# Patient Record
Sex: FEMALE | Race: WHITE | NOT HISPANIC OR LATINO | Employment: FULL TIME | RURAL
[De-identification: names, ages, dates, MRNs, and addresses within clinical notes are randomized per-mention and may not be internally consistent; named-entity substitution may affect disease eponyms.]

---

## 2017-08-31 ENCOUNTER — HISTORICAL (OUTPATIENT)
Dept: ADMINISTRATIVE | Facility: HOSPITAL | Age: 37
End: 2017-08-31

## 2017-09-06 LAB
LAB AP CLINICAL INFORMATION: NORMAL
LAB AP GENERAL CAT - HISTORICAL: NORMAL
LAB AP INTERPRETATION/RESULT - HISTORICAL: NEGATIVE
LAB AP SPECIMEN ADEQUACY - HISTORICAL: NORMAL
LAB AP SPECIMEN SUBMITTED - HISTORICAL: NORMAL

## 2018-05-23 ENCOUNTER — HISTORICAL (OUTPATIENT)
Dept: ADMINISTRATIVE | Facility: HOSPITAL | Age: 38
End: 2018-05-23

## 2018-05-24 LAB
LAB AP CLINICAL INFORMATION: NORMAL
LAB AP DIAGNOSIS - HISTORICAL: NORMAL
LAB AP GROSS PATHOLOGY - HISTORICAL: NORMAL
LAB AP SPECIMEN SUBMITTED - HISTORICAL: NORMAL

## 2018-07-16 ENCOUNTER — HISTORICAL (OUTPATIENT)
Dept: ADMINISTRATIVE | Facility: HOSPITAL | Age: 38
End: 2018-07-16

## 2020-06-16 ENCOUNTER — HISTORICAL (OUTPATIENT)
Dept: ADMINISTRATIVE | Facility: HOSPITAL | Age: 40
End: 2020-06-16

## 2020-10-20 ENCOUNTER — HISTORICAL (OUTPATIENT)
Dept: ADMINISTRATIVE | Facility: HOSPITAL | Age: 40
End: 2020-10-20

## 2020-10-20 LAB
ALBUMIN SERPL BCP-MCNC: 3.9 G/DL (ref 3.4–5)
ALBUMIN/GLOB SERPL: 1 {RATIO}
ALP SERPL-CCNC: 102 U/L (ref 50–136)
ALT SERPL W P-5'-P-CCNC: 31 U/L (ref 12–78)
AMYLASE SERPL-CCNC: 96 U/L (ref 25–115)
ANION GAP SERPL CALCULATED.3IONS-SCNC: 14 MMOL/L
AST SERPL W P-5'-P-CCNC: 19 U/L (ref 15–37)
BILIRUB SERPL-MCNC: 0.8 MG/DL (ref 0.2–1)
BUN SERPL-MCNC: 18 MG/DL (ref 7–18)
CALCIUM SERPL-MCNC: 10.2 MG/DL (ref 8.5–10.1)
CHLORIDE SERPL-SCNC: 100 MMOL/L (ref 101–111)
CO2 SERPL-SCNC: 26 MMOL/L (ref 21–32)
CREAT SERPL-MCNC: 0.9 MG/DL (ref 0.6–1.3)
ERYTHROCYTE [DISTWIDTH] IN BLOOD BY AUTOMATED COUNT: 13.1 % (ref 11.5–14.5)
GLOBULIN SER-MCNC: 3.7 G/DL
GLUCOSE SERPL-MCNC: 96 MG/DL (ref 74–106)
HCT VFR BLD AUTO: 43.5 % (ref 38–47)
HGB BLD-MCNC: 14.7 G/DL (ref 12–16)
LIPASE SERPL-CCNC: 753 U/L (ref 73–393)
LYMPHOCYTES # BLD AUTO: 1.7 X10E3/UL (ref 0.9–3)
LYMPHOCYTES NFR BLD AUTO: 15.3 % (ref 16.8–42.5)
MCH RBC QN AUTO: 29.5 PG (ref 27–31)
MCHC RBC AUTO-ENTMCNC: 33.8 G/DL (ref 32–36)
MCV RBC AUTO: 87.2 FL (ref 80–96)
MIXED, AUTO ABSOLUTE: 0.6 X10E3/UL (ref 0.3–1.1)
MIXED, AUTO RELATIVE: 6 % (ref 4.7–13.3)
MPC BLD CALC-MCNC: 9.6 FL (ref 9.4–12.4)
NEUTROPHILS # BLD AUTO: 8.5 X10E3/UL (ref 2.4–7.5)
NEUTROPHILS NFR BLD AUTO: 78.7 % (ref 46.4–74.7)
PLATELET # BLD AUTO: 190 X10E3/UL (ref 150–400)
POTASSIUM SERPL-SCNC: 4.2 MMOL/L (ref 3.6–5)
PROT SERPL-MCNC: 7.6 G/DL (ref 6.4–8.2)
RBC # BLD AUTO: 4.99 X10E6/UL (ref 4.2–5.4)
SODIUM SERPL-SCNC: 136 MMOL/L (ref 135–145)
WBC # BLD AUTO: 10.8 X10E3/UL (ref 4.5–11)

## 2021-03-08 ENCOUNTER — HISTORICAL (OUTPATIENT)
Dept: ADMINISTRATIVE | Facility: HOSPITAL | Age: 41
End: 2021-03-08

## 2021-07-28 ENCOUNTER — OFFICE VISIT (OUTPATIENT)
Dept: FAMILY MEDICINE | Facility: CLINIC | Age: 41
End: 2021-07-28
Payer: COMMERCIAL

## 2021-07-28 VITALS — OXYGEN SATURATION: 96 % | HEART RATE: 64 BPM | TEMPERATURE: 98 F | RESPIRATION RATE: 18 BRPM

## 2021-07-28 DIAGNOSIS — J32.0 MAXILLARY SINUSITIS, UNSPECIFIED CHRONICITY: ICD-10-CM

## 2021-07-28 DIAGNOSIS — R05.8 COUGH WITH EXPOSURE TO COVID-19 VIRUS: Primary | ICD-10-CM

## 2021-07-28 DIAGNOSIS — Z20.822 COUGH WITH EXPOSURE TO COVID-19 VIRUS: Primary | ICD-10-CM

## 2021-07-28 LAB
CTP QC/QA: YES
FLUAV AG NPH QL: NEGATIVE
FLUBV AG NPH QL: NEGATIVE
SARS-COV-2 AG RESP QL IA.RAPID: NEGATIVE

## 2021-07-28 PROCEDURE — 87635 SARS-COV-2 COVID-19 AMP PRB: CPT | Mod: ,,, | Performed by: CLINICAL MEDICAL LABORATORY

## 2021-07-28 PROCEDURE — 1159F MED LIST DOCD IN RCRD: CPT | Mod: CPTII,,, | Performed by: NURSE PRACTITIONER

## 2021-07-28 PROCEDURE — 87635 SARS-COV-2 (COVID-19) QUALITATIVE PCR: ICD-10-PCS | Mod: ,,, | Performed by: CLINICAL MEDICAL LABORATORY

## 2021-07-28 PROCEDURE — 87428 SARSCOV & INF VIR A&B AG IA: CPT | Mod: QW,,, | Performed by: NURSE PRACTITIONER

## 2021-07-28 PROCEDURE — 99214 PR OFFICE/OUTPT VISIT, EST, LEVL IV, 30-39 MIN: ICD-10-PCS | Mod: ,,, | Performed by: NURSE PRACTITIONER

## 2021-07-28 PROCEDURE — 1159F PR MEDICATION LIST DOCUMENTED IN MEDICAL RECORD: ICD-10-PCS | Mod: CPTII,,, | Performed by: NURSE PRACTITIONER

## 2021-07-28 PROCEDURE — 99214 OFFICE O/P EST MOD 30 MIN: CPT | Mod: ,,, | Performed by: NURSE PRACTITIONER

## 2021-07-28 PROCEDURE — 87428 POCT SARS-COV2 (COVID) WITH FLU ANTIGEN: ICD-10-PCS | Mod: QW,,, | Performed by: NURSE PRACTITIONER

## 2021-07-28 RX ORDER — PANTOPRAZOLE SODIUM 40 MG/1
1 TABLET, DELAYED RELEASE ORAL DAILY
COMMUNITY
Start: 2021-07-19 | End: 2024-01-05 | Stop reason: SDUPTHER

## 2021-07-28 RX ORDER — ERGOCALCIFEROL 1.25 MG/1
1 CAPSULE ORAL
COMMUNITY
Start: 2021-07-05 | End: 2021-09-29 | Stop reason: SDUPTHER

## 2021-07-28 RX ORDER — NEBIVOLOL HYDROCHLORIDE 10 MG/1
20 TABLET ORAL DAILY
COMMUNITY
Start: 2021-06-14 | End: 2023-01-17

## 2021-07-28 RX ORDER — NEOMYCIN SULFATE, POLYMYXIN B SULFATE, AND DEXAMETHASONE 3.5; 10000; 1 MG/G; [USP'U]/G; MG/G
1 OINTMENT OPHTHALMIC DAILY PRN
COMMUNITY
Start: 2021-04-13 | End: 2024-01-05

## 2021-07-28 RX ORDER — AZITHROMYCIN 250 MG/1
TABLET, FILM COATED ORAL
Qty: 6 TABLET | Refills: 0 | Status: SHIPPED | OUTPATIENT
Start: 2021-07-28 | End: 2021-08-02

## 2021-07-28 RX ORDER — ASPIRIN 81 MG/1
81 TABLET ORAL DAILY
COMMUNITY

## 2021-07-28 RX ORDER — FLUTICASONE PROPIONATE 50 MCG
1 SPRAY, SUSPENSION (ML) NASAL DAILY
Qty: 18 G | Refills: 0 | Status: SHIPPED | OUTPATIENT
Start: 2021-07-28 | End: 2021-09-29

## 2021-07-29 LAB — SARS-COV-2 RNA RESP QL NAA+PROBE: NEGATIVE

## 2021-08-16 ENCOUNTER — OFFICE VISIT (OUTPATIENT)
Dept: FAMILY MEDICINE | Facility: CLINIC | Age: 41
End: 2021-08-16
Payer: COMMERCIAL

## 2021-08-16 VITALS
SYSTOLIC BLOOD PRESSURE: 132 MMHG | RESPIRATION RATE: 20 BRPM | HEART RATE: 96 BPM | TEMPERATURE: 98 F | DIASTOLIC BLOOD PRESSURE: 62 MMHG | OXYGEN SATURATION: 97 %

## 2021-08-16 DIAGNOSIS — R51.9 NONINTRACTABLE HEADACHE, UNSPECIFIED CHRONICITY PATTERN, UNSPECIFIED HEADACHE TYPE: ICD-10-CM

## 2021-08-16 DIAGNOSIS — Z20.822 EXPOSURE TO COVID-19 VIRUS: Primary | ICD-10-CM

## 2021-08-16 LAB
CTP QC/QA: YES
SARS-COV-2 AG RESP QL IA.RAPID: NEGATIVE

## 2021-08-16 PROCEDURE — 3078F PR MOST RECENT DIASTOLIC BLOOD PRESSURE < 80 MM HG: ICD-10-PCS | Mod: CPTII,,, | Performed by: NURSE PRACTITIONER

## 2021-08-16 PROCEDURE — 87426 SARS CORONAVIRUS 2 ANTIGEN POCT: ICD-10-PCS | Mod: QW,,, | Performed by: NURSE PRACTITIONER

## 2021-08-16 PROCEDURE — 87426 SARSCOV CORONAVIRUS AG IA: CPT | Mod: QW,,, | Performed by: NURSE PRACTITIONER

## 2021-08-16 PROCEDURE — 3075F PR MOST RECENT SYSTOLIC BLOOD PRESS GE 130-139MM HG: ICD-10-PCS | Mod: CPTII,,, | Performed by: NURSE PRACTITIONER

## 2021-08-16 PROCEDURE — 87635 SARS-COV-2 (COVID-19) QUALITATIVE PCR: ICD-10-PCS | Mod: ,,, | Performed by: CLINICAL MEDICAL LABORATORY

## 2021-08-16 PROCEDURE — 87635 SARS-COV-2 COVID-19 AMP PRB: CPT | Mod: ,,, | Performed by: CLINICAL MEDICAL LABORATORY

## 2021-08-16 PROCEDURE — 3078F DIAST BP <80 MM HG: CPT | Mod: CPTII,,, | Performed by: NURSE PRACTITIONER

## 2021-08-16 PROCEDURE — 1159F MED LIST DOCD IN RCRD: CPT | Mod: CPTII,,, | Performed by: NURSE PRACTITIONER

## 2021-08-16 PROCEDURE — 99214 PR OFFICE/OUTPT VISIT, EST, LEVL IV, 30-39 MIN: ICD-10-PCS | Mod: ,,, | Performed by: NURSE PRACTITIONER

## 2021-08-16 PROCEDURE — 3075F SYST BP GE 130 - 139MM HG: CPT | Mod: CPTII,,, | Performed by: NURSE PRACTITIONER

## 2021-08-16 PROCEDURE — 1159F PR MEDICATION LIST DOCUMENTED IN MEDICAL RECORD: ICD-10-PCS | Mod: CPTII,,, | Performed by: NURSE PRACTITIONER

## 2021-08-16 PROCEDURE — 1126F PR PAIN SEVERITY QUANTIFIED, NO PAIN PRESENT: ICD-10-PCS | Mod: CPTII,,, | Performed by: NURSE PRACTITIONER

## 2021-08-16 PROCEDURE — 1126F AMNT PAIN NOTED NONE PRSNT: CPT | Mod: CPTII,,, | Performed by: NURSE PRACTITIONER

## 2021-08-16 PROCEDURE — 99214 OFFICE O/P EST MOD 30 MIN: CPT | Mod: ,,, | Performed by: NURSE PRACTITIONER

## 2021-08-16 RX ORDER — PREDNISONE 10 MG/1
10 TABLET ORAL DAILY
Qty: 10 TABLET | Refills: 0 | Status: SHIPPED | OUTPATIENT
Start: 2021-08-16 | End: 2021-09-29

## 2021-08-16 RX ORDER — LOPERAMIDE HYDROCHLORIDE 2 MG/1
2 CAPSULE ORAL 4 TIMES DAILY PRN
Qty: 30 CAPSULE | Refills: 0 | Status: SHIPPED | OUTPATIENT
Start: 2021-08-16 | End: 2021-08-26

## 2021-08-16 RX ORDER — FAMOTIDINE 20 MG/1
20 TABLET, FILM COATED ORAL 2 TIMES DAILY
Qty: 56 TABLET | Refills: 0 | Status: SHIPPED | OUTPATIENT
Start: 2021-08-16 | End: 2022-08-08

## 2021-08-16 RX ORDER — ONDANSETRON 4 MG/1
4 TABLET, FILM COATED ORAL 2 TIMES DAILY
Qty: 21 TABLET | Refills: 0 | Status: SHIPPED | OUTPATIENT
Start: 2021-08-16 | End: 2021-09-29

## 2021-08-16 RX ORDER — AZITHROMYCIN 250 MG/1
TABLET, FILM COATED ORAL
Qty: 6 TABLET | Refills: 0 | Status: SHIPPED | OUTPATIENT
Start: 2021-08-16 | End: 2021-08-21

## 2021-08-17 LAB — SARS-COV-2 RNA RESP QL NAA+PROBE: NEGATIVE

## 2021-09-13 ENCOUNTER — HOSPITAL ENCOUNTER (OUTPATIENT)
Dept: RADIOLOGY | Facility: HOSPITAL | Age: 41
Discharge: HOME | End: 2021-09-13
Payer: COMMERCIAL

## 2021-09-13 VITALS — HEIGHT: 67 IN | BODY MASS INDEX: 45.99 KG/M2 | WEIGHT: 293 LBS

## 2021-09-13 DIAGNOSIS — Z12.39 SCREENING FOR BREAST CANCER: ICD-10-CM

## 2021-09-13 PROCEDURE — 77067 SCR MAMMO BI INCL CAD: CPT | Mod: TC

## 2021-09-29 ENCOUNTER — OFFICE VISIT (OUTPATIENT)
Dept: FAMILY MEDICINE | Facility: CLINIC | Age: 41
End: 2021-09-29
Payer: COMMERCIAL

## 2021-09-29 VITALS
TEMPERATURE: 98 F | HEART RATE: 72 BPM | BODY MASS INDEX: 45.99 KG/M2 | OXYGEN SATURATION: 98 % | RESPIRATION RATE: 18 BRPM | WEIGHT: 293 LBS | DIASTOLIC BLOOD PRESSURE: 82 MMHG | SYSTOLIC BLOOD PRESSURE: 132 MMHG | HEIGHT: 67 IN

## 2021-09-29 DIAGNOSIS — I10 HYPERTENSION, UNSPECIFIED TYPE: Primary | ICD-10-CM

## 2021-09-29 DIAGNOSIS — Z11.52 ENCOUNTER FOR SCREENING FOR COVID-19: ICD-10-CM

## 2021-09-29 DIAGNOSIS — E55.9 VITAMIN D DEFICIENCY: ICD-10-CM

## 2021-09-29 LAB
ALBUMIN SERPL BCP-MCNC: 3.8 G/DL (ref 3.5–5)
ALBUMIN/GLOB SERPL: 1 {RATIO}
ALP SERPL-CCNC: 93 U/L (ref 37–98)
ALT SERPL W P-5'-P-CCNC: 33 U/L (ref 13–56)
ANION GAP SERPL CALCULATED.3IONS-SCNC: 10 MMOL/L (ref 7–16)
AST SERPL W P-5'-P-CCNC: 23 U/L (ref 15–37)
BASOPHILS # BLD AUTO: 0.05 K/UL (ref 0–0.2)
BASOPHILS NFR BLD AUTO: 0.6 % (ref 0–1)
BILIRUB SERPL-MCNC: 0.8 MG/DL (ref 0–1.2)
BUN SERPL-MCNC: 15 MG/DL (ref 7–18)
BUN/CREAT SERPL: 18 (ref 6–20)
CALCIUM SERPL-MCNC: 9.6 MG/DL (ref 8.5–10.1)
CHLORIDE SERPL-SCNC: 108 MMOL/L (ref 98–107)
CHOLEST SERPL-MCNC: 172 MG/DL (ref 0–200)
CHOLEST/HDLC SERPL: 4 {RATIO}
CO2 SERPL-SCNC: 26 MMOL/L (ref 21–32)
CREAT SERPL-MCNC: 0.84 MG/DL (ref 0.55–1.02)
DIFFERENTIAL METHOD BLD: ABNORMAL
EOSINOPHIL # BLD AUTO: 0.28 K/UL (ref 0–0.5)
EOSINOPHIL NFR BLD AUTO: 3.4 % (ref 1–4)
ERYTHROCYTE [DISTWIDTH] IN BLOOD BY AUTOMATED COUNT: 13.4 % (ref 11.5–14.5)
GLOBULIN SER-MCNC: 3.8 G/DL (ref 2–4)
GLUCOSE SERPL-MCNC: 98 MG/DL (ref 74–106)
HCT VFR BLD AUTO: 42.9 % (ref 38–47)
HDLC SERPL-MCNC: 43 MG/DL (ref 40–60)
HGB BLD-MCNC: 14.3 G/DL (ref 12–16)
IMM GRANULOCYTES # BLD AUTO: 0.06 K/UL (ref 0–0.04)
IMM GRANULOCYTES NFR BLD: 0.7 % (ref 0–0.4)
LDLC SERPL CALC-MCNC: 91 MG/DL
LDLC/HDLC SERPL: 2.1 {RATIO}
LYMPHOCYTES # BLD AUTO: 1.86 K/UL (ref 1–4.8)
LYMPHOCYTES NFR BLD AUTO: 22.6 % (ref 27–41)
MCH RBC QN AUTO: 28.9 PG (ref 27–31)
MCHC RBC AUTO-ENTMCNC: 33.3 G/DL (ref 32–36)
MCV RBC AUTO: 86.7 FL (ref 80–96)
MONOCYTES # BLD AUTO: 0.64 K/UL (ref 0–0.8)
MONOCYTES NFR BLD AUTO: 7.8 % (ref 2–6)
MPC BLD CALC-MCNC: 11.5 FL (ref 9.4–12.4)
NEUTROPHILS # BLD AUTO: 5.34 K/UL (ref 1.8–7.7)
NEUTROPHILS NFR BLD AUTO: 64.9 % (ref 53–65)
NONHDLC SERPL-MCNC: 129 MG/DL
NRBC # BLD AUTO: 0 X10E3/UL
NRBC, AUTO (.00): 0 %
PLATELET # BLD AUTO: 221 K/UL (ref 150–400)
POTASSIUM SERPL-SCNC: 4.5 MMOL/L (ref 3.5–5.1)
PROT SERPL-MCNC: 7.6 G/DL (ref 6.4–8.2)
RBC # BLD AUTO: 4.95 M/UL (ref 4.2–5.4)
SODIUM SERPL-SCNC: 139 MMOL/L (ref 136–145)
TRIGL SERPL-MCNC: 190 MG/DL (ref 35–150)
VLDLC SERPL-MCNC: 38 MG/DL
WBC # BLD AUTO: 8.23 K/UL (ref 4.5–11)

## 2021-09-29 PROCEDURE — 99214 OFFICE O/P EST MOD 30 MIN: CPT | Mod: ,,, | Performed by: NURSE PRACTITIONER

## 2021-09-29 PROCEDURE — 1159F PR MEDICATION LIST DOCUMENTED IN MEDICAL RECORD: ICD-10-PCS | Mod: CPTII,,, | Performed by: NURSE PRACTITIONER

## 2021-09-29 PROCEDURE — 1159F MED LIST DOCD IN RCRD: CPT | Mod: CPTII,,, | Performed by: NURSE PRACTITIONER

## 2021-09-29 PROCEDURE — 3075F SYST BP GE 130 - 139MM HG: CPT | Mod: CPTII,,, | Performed by: NURSE PRACTITIONER

## 2021-09-29 PROCEDURE — 3008F BODY MASS INDEX DOCD: CPT | Mod: CPTII,,, | Performed by: NURSE PRACTITIONER

## 2021-09-29 PROCEDURE — 80053 COMPREHEN METABOLIC PANEL: CPT | Mod: ,,, | Performed by: CLINICAL MEDICAL LABORATORY

## 2021-09-29 PROCEDURE — 3075F PR MOST RECENT SYSTOLIC BLOOD PRESS GE 130-139MM HG: ICD-10-PCS | Mod: CPTII,,, | Performed by: NURSE PRACTITIONER

## 2021-09-29 PROCEDURE — 80061 LIPID PANEL: CPT | Mod: ,,, | Performed by: CLINICAL MEDICAL LABORATORY

## 2021-09-29 PROCEDURE — 3008F PR BODY MASS INDEX (BMI) DOCUMENTED: ICD-10-PCS | Mod: CPTII,,, | Performed by: NURSE PRACTITIONER

## 2021-09-29 PROCEDURE — 85025 CBC WITH DIFFERENTIAL: ICD-10-PCS | Mod: ,,, | Performed by: CLINICAL MEDICAL LABORATORY

## 2021-09-29 PROCEDURE — 80053 COMPREHENSIVE METABOLIC PANEL: ICD-10-PCS | Mod: ,,, | Performed by: CLINICAL MEDICAL LABORATORY

## 2021-09-29 PROCEDURE — 86769 SARS-COV-2 COVID-19 ANTIBODY: CPT | Mod: 90,,, | Performed by: CLINICAL MEDICAL LABORATORY

## 2021-09-29 PROCEDURE — 80061 LIPID PANEL: ICD-10-PCS | Mod: ,,, | Performed by: CLINICAL MEDICAL LABORATORY

## 2021-09-29 PROCEDURE — 3079F PR MOST RECENT DIASTOLIC BLOOD PRESSURE 80-89 MM HG: ICD-10-PCS | Mod: CPTII,,, | Performed by: NURSE PRACTITIONER

## 2021-09-29 PROCEDURE — 3079F DIAST BP 80-89 MM HG: CPT | Mod: CPTII,,, | Performed by: NURSE PRACTITIONER

## 2021-09-29 PROCEDURE — 86769 SARS-COV-2 SPIKE AB, SEMI-QUANT, S: ICD-10-PCS | Mod: 90,,, | Performed by: CLINICAL MEDICAL LABORATORY

## 2021-09-29 PROCEDURE — 99214 PR OFFICE/OUTPT VISIT, EST, LEVL IV, 30-39 MIN: ICD-10-PCS | Mod: ,,, | Performed by: NURSE PRACTITIONER

## 2021-09-29 PROCEDURE — 85025 COMPLETE CBC W/AUTO DIFF WBC: CPT | Mod: ,,, | Performed by: CLINICAL MEDICAL LABORATORY

## 2021-09-29 RX ORDER — ERGOCALCIFEROL 1.25 MG/1
50000 CAPSULE ORAL
Qty: 3 CAPSULE | Refills: 1 | Status: SHIPPED | OUTPATIENT
Start: 2021-09-29 | End: 2022-03-29 | Stop reason: SDUPTHER

## 2021-10-01 LAB
M SARS-COV-2 SPIKE AB, INTERP, S: POSITIVE
M SARS-COV-2 SPIKE AB, QUANT, S: >250 U/ML

## 2021-10-29 ENCOUNTER — OFFICE VISIT (OUTPATIENT)
Dept: FAMILY MEDICINE | Facility: CLINIC | Age: 41
End: 2021-10-29
Payer: COMMERCIAL

## 2021-10-29 VITALS
SYSTOLIC BLOOD PRESSURE: 132 MMHG | RESPIRATION RATE: 20 BRPM | TEMPERATURE: 98 F | OXYGEN SATURATION: 99 % | HEART RATE: 74 BPM | DIASTOLIC BLOOD PRESSURE: 72 MMHG

## 2021-10-29 DIAGNOSIS — R09.1 PLEURISY: Primary | ICD-10-CM

## 2021-10-29 DIAGNOSIS — R05.9 COUGH: ICD-10-CM

## 2021-10-29 DIAGNOSIS — R19.7 DIARRHEA, UNSPECIFIED TYPE: ICD-10-CM

## 2021-10-29 PROCEDURE — 3075F PR MOST RECENT SYSTOLIC BLOOD PRESS GE 130-139MM HG: ICD-10-PCS | Mod: CPTII,,, | Performed by: NURSE PRACTITIONER

## 2021-10-29 PROCEDURE — 3075F SYST BP GE 130 - 139MM HG: CPT | Mod: CPTII,,, | Performed by: NURSE PRACTITIONER

## 2021-10-29 PROCEDURE — 99214 OFFICE O/P EST MOD 30 MIN: CPT | Mod: 25,,, | Performed by: NURSE PRACTITIONER

## 2021-10-29 PROCEDURE — 87428 POCT SARS-COV2 (COVID) WITH FLU ANTIGEN: ICD-10-PCS | Mod: QW,,, | Performed by: NURSE PRACTITIONER

## 2021-10-29 PROCEDURE — 96372 THER/PROPH/DIAG INJ SC/IM: CPT | Mod: ,,, | Performed by: NURSE PRACTITIONER

## 2021-10-29 PROCEDURE — 1159F MED LIST DOCD IN RCRD: CPT | Mod: CPTII,,, | Performed by: NURSE PRACTITIONER

## 2021-10-29 PROCEDURE — 1159F PR MEDICATION LIST DOCUMENTED IN MEDICAL RECORD: ICD-10-PCS | Mod: CPTII,,, | Performed by: NURSE PRACTITIONER

## 2021-10-29 PROCEDURE — 99214 PR OFFICE/OUTPT VISIT, EST, LEVL IV, 30-39 MIN: ICD-10-PCS | Mod: 25,,, | Performed by: NURSE PRACTITIONER

## 2021-10-29 PROCEDURE — 87428 SARSCOV & INF VIR A&B AG IA: CPT | Mod: QW,,, | Performed by: NURSE PRACTITIONER

## 2021-10-29 PROCEDURE — 3078F DIAST BP <80 MM HG: CPT | Mod: CPTII,,, | Performed by: NURSE PRACTITIONER

## 2021-10-29 PROCEDURE — 3078F PR MOST RECENT DIASTOLIC BLOOD PRESSURE < 80 MM HG: ICD-10-PCS | Mod: CPTII,,, | Performed by: NURSE PRACTITIONER

## 2021-10-29 PROCEDURE — 96372 PR INJECTION,THERAP/PROPH/DIAG2ST, IM OR SUBCUT: ICD-10-PCS | Mod: ,,, | Performed by: NURSE PRACTITIONER

## 2021-10-29 RX ORDER — DEXAMETHASONE SODIUM PHOSPHATE 4 MG/ML
4 INJECTION, SOLUTION INTRA-ARTICULAR; INTRALESIONAL; INTRAMUSCULAR; INTRAVENOUS; SOFT TISSUE
Status: COMPLETED | OUTPATIENT
Start: 2021-10-29 | End: 2021-10-29

## 2021-10-29 RX ORDER — LINCOMYCIN HYDROCHLORIDE 300 MG/ML
600 INJECTION, SOLUTION INTRAMUSCULAR; INTRAVENOUS; SUBCONJUNCTIVAL
Status: COMPLETED | OUTPATIENT
Start: 2021-10-29 | End: 2021-10-29

## 2021-10-29 RX ORDER — DIPHENOXYLATE HYDROCHLORIDE AND ATROPINE SULFATE 2.5; .025 MG/1; MG/1
1 TABLET ORAL 4 TIMES DAILY PRN
Qty: 28 TABLET | Refills: 1 | Status: SHIPPED | OUTPATIENT
Start: 2021-10-29 | End: 2021-11-08

## 2021-10-29 RX ORDER — PREDNISONE 10 MG/1
10 TABLET ORAL DAILY
Qty: 10 TABLET | Refills: 0 | Status: SHIPPED | OUTPATIENT
Start: 2021-10-29 | End: 2022-08-08

## 2021-10-29 RX ORDER — AZITHROMYCIN 250 MG/1
TABLET, FILM COATED ORAL
Qty: 6 TABLET | Refills: 0 | Status: SHIPPED | OUTPATIENT
Start: 2021-10-29 | End: 2021-11-03

## 2021-10-29 RX ORDER — NAPROXEN SODIUM 550 MG/1
550 TABLET ORAL 2 TIMES DAILY WITH MEALS
Qty: 20 TABLET | Refills: 0 | Status: SHIPPED | OUTPATIENT
Start: 2021-10-29 | End: 2022-08-08

## 2021-10-29 RX ADMIN — DEXAMETHASONE SODIUM PHOSPHATE 4 MG: 4 INJECTION, SOLUTION INTRA-ARTICULAR; INTRALESIONAL; INTRAMUSCULAR; INTRAVENOUS; SOFT TISSUE at 09:10

## 2021-10-29 RX ADMIN — LINCOMYCIN HYDROCHLORIDE 600 MG: 300 INJECTION, SOLUTION INTRAMUSCULAR; INTRAVENOUS; SUBCONJUNCTIVAL at 09:10

## 2022-03-07 ENCOUNTER — OFFICE VISIT (OUTPATIENT)
Dept: FAMILY MEDICINE | Facility: CLINIC | Age: 42
End: 2022-03-07
Payer: COMMERCIAL

## 2022-03-07 VITALS
RESPIRATION RATE: 18 BRPM | HEART RATE: 93 BPM | BODY MASS INDEX: 45.99 KG/M2 | OXYGEN SATURATION: 98 % | DIASTOLIC BLOOD PRESSURE: 76 MMHG | TEMPERATURE: 98 F | WEIGHT: 293 LBS | HEIGHT: 67 IN | SYSTOLIC BLOOD PRESSURE: 134 MMHG

## 2022-03-07 DIAGNOSIS — R09.81 NASAL CONGESTION: Primary | ICD-10-CM

## 2022-03-07 DIAGNOSIS — J01.01 ACUTE RECURRENT MAXILLARY SINUSITIS: ICD-10-CM

## 2022-03-07 PROBLEM — R09.1 PLEURISY: Status: RESOLVED | Noted: 2021-10-29 | Resolved: 2022-03-07

## 2022-03-07 PROBLEM — Z11.52 ENCOUNTER FOR SCREENING FOR COVID-19: Status: RESOLVED | Noted: 2021-09-29 | Resolved: 2022-03-07

## 2022-03-07 PROBLEM — R05.9 COUGH: Status: RESOLVED | Noted: 2021-10-29 | Resolved: 2022-03-07

## 2022-03-07 PROBLEM — Z20.822 EXPOSURE TO COVID-19 VIRUS: Status: RESOLVED | Noted: 2021-07-28 | Resolved: 2022-03-07

## 2022-03-07 PROBLEM — R51.9 NONINTRACTABLE HEADACHE: Status: RESOLVED | Noted: 2021-08-16 | Resolved: 2022-03-07

## 2022-03-07 LAB
CTP QC/QA: YES
CTP QC/QA: YES
FLUAV AG NPH QL: NEGATIVE
FLUBV AG NPH QL: NEGATIVE
S PYO RRNA THROAT QL PROBE: NEGATIVE
SARS-COV-2 AG RESP QL IA.RAPID: NEGATIVE

## 2022-03-07 PROCEDURE — 3078F PR MOST RECENT DIASTOLIC BLOOD PRESSURE < 80 MM HG: ICD-10-PCS | Mod: ,,, | Performed by: NURSE PRACTITIONER

## 2022-03-07 PROCEDURE — 87428 SARSCOV & INF VIR A&B AG IA: CPT | Mod: QW,,, | Performed by: NURSE PRACTITIONER

## 2022-03-07 PROCEDURE — 87428 POCT SARS-COV2 (COVID) WITH FLU ANTIGEN: ICD-10-PCS | Mod: QW,,, | Performed by: NURSE PRACTITIONER

## 2022-03-07 PROCEDURE — 99214 OFFICE O/P EST MOD 30 MIN: CPT | Mod: 25,,, | Performed by: NURSE PRACTITIONER

## 2022-03-07 PROCEDURE — 3075F SYST BP GE 130 - 139MM HG: CPT | Mod: ,,, | Performed by: NURSE PRACTITIONER

## 2022-03-07 PROCEDURE — 3075F PR MOST RECENT SYSTOLIC BLOOD PRESS GE 130-139MM HG: ICD-10-PCS | Mod: ,,, | Performed by: NURSE PRACTITIONER

## 2022-03-07 PROCEDURE — 87880 STREP A ASSAY W/OPTIC: CPT | Mod: QW,,, | Performed by: NURSE PRACTITIONER

## 2022-03-07 PROCEDURE — 96372 THER/PROPH/DIAG INJ SC/IM: CPT | Mod: ,,, | Performed by: NURSE PRACTITIONER

## 2022-03-07 PROCEDURE — 3078F DIAST BP <80 MM HG: CPT | Mod: ,,, | Performed by: NURSE PRACTITIONER

## 2022-03-07 PROCEDURE — 3008F PR BODY MASS INDEX (BMI) DOCUMENTED: ICD-10-PCS | Mod: ,,, | Performed by: NURSE PRACTITIONER

## 2022-03-07 PROCEDURE — 96372 PR INJECTION,THERAP/PROPH/DIAG2ST, IM OR SUBCUT: ICD-10-PCS | Mod: ,,, | Performed by: NURSE PRACTITIONER

## 2022-03-07 PROCEDURE — 3008F BODY MASS INDEX DOCD: CPT | Mod: ,,, | Performed by: NURSE PRACTITIONER

## 2022-03-07 PROCEDURE — 1159F PR MEDICATION LIST DOCUMENTED IN MEDICAL RECORD: ICD-10-PCS | Mod: ,,, | Performed by: NURSE PRACTITIONER

## 2022-03-07 PROCEDURE — 87880 POCT RAPID STREP A: ICD-10-PCS | Mod: QW,,, | Performed by: NURSE PRACTITIONER

## 2022-03-07 PROCEDURE — 1159F MED LIST DOCD IN RCRD: CPT | Mod: ,,, | Performed by: NURSE PRACTITIONER

## 2022-03-07 PROCEDURE — 99214 PR OFFICE/OUTPT VISIT, EST, LEVL IV, 30-39 MIN: ICD-10-PCS | Mod: 25,,, | Performed by: NURSE PRACTITIONER

## 2022-03-07 RX ORDER — DOXYCYCLINE 100 MG/1
100 CAPSULE ORAL 2 TIMES DAILY
Qty: 20 CAPSULE | Refills: 0 | Status: SHIPPED | OUTPATIENT
Start: 2022-03-07 | End: 2022-08-08 | Stop reason: ALTCHOICE

## 2022-03-07 RX ORDER — DEXAMETHASONE SODIUM PHOSPHATE 4 MG/ML
4 INJECTION, SOLUTION INTRA-ARTICULAR; INTRALESIONAL; INTRAMUSCULAR; INTRAVENOUS; SOFT TISSUE ONCE
Status: COMPLETED | OUTPATIENT
Start: 2022-03-07 | End: 2022-03-07

## 2022-03-07 RX ORDER — CHLORPHENIRAMINE MALEATE AND PHENYLEPHRINE HYDROCHLORIDE 4; 10 MG/1; MG/1
1 TABLET, COATED ORAL EVERY 6 HOURS PRN
Qty: 30 TABLET | Refills: 0 | Status: SHIPPED | OUTPATIENT
Start: 2022-03-07 | End: 2022-03-17

## 2022-03-07 RX ORDER — FLUTICASONE PROPIONATE 50 MCG
1 SPRAY, SUSPENSION (ML) NASAL DAILY
Qty: 15.8 ML | Refills: 5 | Status: SHIPPED | OUTPATIENT
Start: 2022-03-07 | End: 2022-08-08

## 2022-03-07 RX ORDER — LINCOMYCIN HYDROCHLORIDE 300 MG/ML
600 INJECTION, SOLUTION INTRAMUSCULAR; INTRAVENOUS; SUBCONJUNCTIVAL
Status: COMPLETED | OUTPATIENT
Start: 2022-03-07 | End: 2022-03-07

## 2022-03-07 RX ADMIN — DEXAMETHASONE SODIUM PHOSPHATE 4 MG: 4 INJECTION, SOLUTION INTRA-ARTICULAR; INTRALESIONAL; INTRAMUSCULAR; INTRAVENOUS; SOFT TISSUE at 09:03

## 2022-03-07 RX ADMIN — LINCOMYCIN HYDROCHLORIDE 600 MG: 300 INJECTION, SOLUTION INTRAMUSCULAR; INTRAVENOUS; SUBCONJUNCTIVAL at 09:03

## 2022-03-07 NOTE — PROGRESS NOTES
Sarah Dacosta NP   [facility]  [address]  [place]     PATIENT NAME: Komal Busch  : 1980  DATE: 3/7/22  MRN: [id number]      Billing Provider: Sarah Dacosta NP  Level of Service:   Patient PCP Information     Provider PCP Type    Sarah Dacosta NP General          Reason for Visit / Chief Complaint: Nasal Congestion, Cough, Sore Throat, and Diarrhea (Symptoms began saturday)       Update PCP  Update Chief Complaint         History of Present Illness / Problem Focused Workflow     Komal Busch presents to the clinic here for eval of nasal congestion, cough, sore throat and diarrhea that started 3 days ago.       Review of Systems     Review of Systems   Constitutional: Negative for chills, fatigue and fever.   HENT: Positive for nasal congestion, sinus pressure/congestion and sore throat. Negative for ear pain, facial swelling, hearing loss, mouth dryness, mouth sores, postnasal drip, rhinorrhea and goiter.    Eyes: Negative for discharge and itching.   Respiratory: Negative for cough, shortness of breath and wheezing.    Cardiovascular: Negative for chest pain and leg swelling.   Gastrointestinal: Positive for diarrhea. Negative for abdominal pain, change in bowel habit and change in bowel habit.   Genitourinary: Negative for difficulty urinating, dysuria, enuresis, frequency, hematuria and urgency.   Neurological: Negative for dizziness, vertigo, syncope, weakness and headaches.   Psychiatric/Behavioral: Negative for decreased concentration.        Medical / Social / Family History     Past Medical History:   Diagnosis Date    GERD (gastroesophageal reflux disease)     Hypertension        Past Surgical History:   Procedure Laterality Date    CHOLECYSTECTOMY      HYSTERECTOMY      KNEE SURGERY Left        Social History  Ms.  reports that she has never smoked. She has never used smokeless tobacco. She reports previous alcohol use. She reports that she does not use  "drugs.    Family History  Ms.'s family history is not on file.    Medications and Allergies     Medications  Outpatient Medications Marked as Taking for the 3/7/22 encounter (Office Visit) with Sarah Dacosta NP   Medication Sig Dispense Refill    BYSTOLIC 10 mg Tab Take 1 tablet by mouth once daily.      ergocalciferol (ERGOCALCIFEROL) 50,000 unit Cap Take 1 capsule (50,000 Units total) by mouth every 30 days. 3 capsule 1    pantoprazole (PROTONIX) 40 MG tablet Take 1 tablet by mouth once daily.         Allergies  Review of patient's allergies indicates:   Allergen Reactions    Keflex [cephalexin]     Neosporin [neomycin-bacitracnzn-polymyxnb]     Pcn [penicillins]     Reglan [metoclopramide]        Physical Examination     Vitals:    03/07/22 0845   BP: 134/76   BP Location: Left arm   Patient Position: Sitting   BP Method: Medium (Manual)   Pulse: 93   Resp: 18   Temp: 98.2 °F (36.8 °C)   TempSrc: Oral   SpO2: 98%   Weight: (!) 143.3 kg (316 lb)   Height: 5' 7" (1.702 m)      Physical Exam  Vitals and nursing note reviewed.   Constitutional:       Appearance: Normal appearance.   HENT:      Head: Normocephalic.      Right Ear: Tympanic membrane, ear canal and external ear normal.      Left Ear: Tympanic membrane, ear canal and external ear normal.      Nose: Congestion present.      Comments: Mod amt thick yellow nasal secretion, pressure over max region     Mouth/Throat:      Mouth: Mucous membranes are moist.   Eyes:      Extraocular Movements: Extraocular movements intact.      Conjunctiva/sclera: Conjunctivae normal.      Pupils: Pupils are equal, round, and reactive to light.   Neck:      Comments: Jarett ant cervical nodes  Cardiovascular:      Rate and Rhythm: Normal rate and regular rhythm.      Pulses: Normal pulses.      Heart sounds: Normal heart sounds.   Pulmonary:      Effort: Pulmonary effort is normal.      Breath sounds: Normal breath sounds.   Musculoskeletal:         General: Normal range " of motion.      Cervical back: Normal range of motion.   Lymphadenopathy:      Cervical: Cervical adenopathy present.   Skin:     General: Skin is warm and dry.   Neurological:      General: No focal deficit present.      Mental Status: She is alert and oriented to person, place, and time.   Psychiatric:         Behavior: Behavior normal.          Assessment and Plan (including Health Maintenance)      Problem List  Smart [employer]  Document Outside HM   :    Plan:     Nasal congestion  -     POCT SARS-COV2 (COVID) with Flu Antigen  -     POCT rapid strep A            Health Maintenance Due   Topic Date Due    Hepatitis C Screening  Never done    COVID-19 Vaccine (1) Never done    HIV Screening  Never done    TETANUS VACCINE  Never done    Influenza Vaccine (1) Never done       Problem List Items Addressed This Visit    None     Visit Diagnoses     Nasal congestion    -  Primary    Relevant Orders    POCT SARS-COV2 (COVID) with Flu Antigen (Completed)    POCT rapid strep A (Completed)            Health Maintenance Topics with due status: Not Due       Topic Last Completion Date    Mammogram 09/13/2021       Procedures     Future Appointments   Date Time Provider Department Center   3/29/2022  9:00 AM Sarah Dacosta NP [facility]   9/19/2022  9:30 AM [facility]        No follow-ups on file.       Signature:  Sarah Dacosta NP    Date of encounter: 3/7/22

## 2022-03-29 ENCOUNTER — OFFICE VISIT (OUTPATIENT)
Dept: FAMILY MEDICINE | Facility: CLINIC | Age: 42
End: 2022-03-29
Payer: COMMERCIAL

## 2022-03-29 VITALS
HEART RATE: 68 BPM | WEIGHT: 293 LBS | TEMPERATURE: 98 F | SYSTOLIC BLOOD PRESSURE: 120 MMHG | DIASTOLIC BLOOD PRESSURE: 74 MMHG | HEIGHT: 67 IN | RESPIRATION RATE: 18 BRPM | OXYGEN SATURATION: 97 % | BODY MASS INDEX: 45.99 KG/M2

## 2022-03-29 DIAGNOSIS — Z13.1 SCREENING FOR DIABETES MELLITUS: ICD-10-CM

## 2022-03-29 DIAGNOSIS — Z00.00 VISIT FOR PREVENTIVE HEALTH EXAMINATION: Primary | ICD-10-CM

## 2022-03-29 DIAGNOSIS — Z13.220 SCREENING FOR LIPOID DISORDERS: ICD-10-CM

## 2022-03-29 DIAGNOSIS — E55.9 VITAMIN D DEFICIENCY: ICD-10-CM

## 2022-03-29 DIAGNOSIS — Z12.4 ENCOUNTER FOR SCREENING FOR CERVICAL CANCER: ICD-10-CM

## 2022-03-29 DIAGNOSIS — Z12.72 SCREENING FOR VAGINAL CANCER: ICD-10-CM

## 2022-03-29 LAB
25(OH)D3 SERPL-MCNC: 29.2 NG/ML
CHOLEST SERPL-MCNC: 180 MG/DL (ref 0–200)
CHOLEST/HDLC SERPL: 3.5 {RATIO}
GLUCOSE SERPL-MCNC: 97 MG/DL (ref 74–106)
HDLC SERPL-MCNC: 52 MG/DL (ref 40–60)
LDLC SERPL CALC-MCNC: 92 MG/DL
LDLC/HDLC SERPL: 1.8 {RATIO}
NONHDLC SERPL-MCNC: 128 MG/DL
TRIGL SERPL-MCNC: 181 MG/DL (ref 35–150)
VLDLC SERPL-MCNC: 36 MG/DL

## 2022-03-29 PROCEDURE — 3074F PR MOST RECENT SYSTOLIC BLOOD PRESSURE < 130 MM HG: ICD-10-PCS | Mod: ,,, | Performed by: NURSE PRACTITIONER

## 2022-03-29 PROCEDURE — 82306 VITAMIN D 25 HYDROXY: CPT | Mod: ,,, | Performed by: CLINICAL MEDICAL LABORATORY

## 2022-03-29 PROCEDURE — 3078F PR MOST RECENT DIASTOLIC BLOOD PRESSURE < 80 MM HG: ICD-10-PCS | Mod: ,,, | Performed by: NURSE PRACTITIONER

## 2022-03-29 PROCEDURE — 99396 PREV VISIT EST AGE 40-64: CPT | Mod: ,,, | Performed by: NURSE PRACTITIONER

## 2022-03-29 PROCEDURE — 1159F MED LIST DOCD IN RCRD: CPT | Mod: ,,, | Performed by: NURSE PRACTITIONER

## 2022-03-29 PROCEDURE — 82947 GLUCOSE, FASTING: ICD-10-PCS | Mod: ,,, | Performed by: CLINICAL MEDICAL LABORATORY

## 2022-03-29 PROCEDURE — 80061 LIPID PANEL: ICD-10-PCS | Mod: ,,, | Performed by: CLINICAL MEDICAL LABORATORY

## 2022-03-29 PROCEDURE — 3074F SYST BP LT 130 MM HG: CPT | Mod: ,,, | Performed by: NURSE PRACTITIONER

## 2022-03-29 PROCEDURE — 82947 ASSAY GLUCOSE BLOOD QUANT: CPT | Mod: ,,, | Performed by: CLINICAL MEDICAL LABORATORY

## 2022-03-29 PROCEDURE — 82306 VITAMIN D: ICD-10-PCS | Mod: ,,, | Performed by: CLINICAL MEDICAL LABORATORY

## 2022-03-29 PROCEDURE — 1159F PR MEDICATION LIST DOCUMENTED IN MEDICAL RECORD: ICD-10-PCS | Mod: ,,, | Performed by: NURSE PRACTITIONER

## 2022-03-29 PROCEDURE — 80061 LIPID PANEL: CPT | Mod: ,,, | Performed by: CLINICAL MEDICAL LABORATORY

## 2022-03-29 PROCEDURE — 3078F DIAST BP <80 MM HG: CPT | Mod: ,,, | Performed by: NURSE PRACTITIONER

## 2022-03-29 PROCEDURE — 3008F BODY MASS INDEX DOCD: CPT | Mod: ,,, | Performed by: NURSE PRACTITIONER

## 2022-03-29 PROCEDURE — 99396 PR PREVENTIVE VISIT,EST,40-64: ICD-10-PCS | Mod: ,,, | Performed by: NURSE PRACTITIONER

## 2022-03-29 PROCEDURE — 3008F PR BODY MASS INDEX (BMI) DOCUMENTED: ICD-10-PCS | Mod: ,,, | Performed by: NURSE PRACTITIONER

## 2022-03-29 PROCEDURE — 88142 CYTOPATH C/V THIN LAYER: CPT | Mod: GCY | Performed by: NURSE PRACTITIONER

## 2022-03-29 RX ORDER — ERGOCALCIFEROL 1.25 MG/1
50000 CAPSULE ORAL
Qty: 3 CAPSULE | Refills: 0 | Status: SHIPPED | OUTPATIENT
Start: 2022-03-29 | End: 2022-08-08 | Stop reason: SDUPTHER

## 2022-03-29 NOTE — PROGRESS NOTES
Sarah Dacosta NP   [facility]  [address]  [place]     PATIENT NAME: Komal Busch  : 1980  DATE: 3/29/22  MRN: [id number]      Billing Provider: Sarah Dacosta NP  Level of Service:   Patient PCP Information     Provider PCP Type    Sarah Dacosta NP General          Reason for Visit / Chief Complaint: Annual Exam (Healthy You)       Update PCP  Update Chief Complaint         History of Present Illness / Problem Focused Workflow     Komal Busch presents to the clinic here for healthy you, voices n/c      Review of Systems     Review of Systems   Constitutional: Negative for chills, fatigue and fever.   HENT: Negative for nasal congestion, ear pain, facial swelling, hearing loss, mouth dryness, mouth sores, postnasal drip, rhinorrhea, sinus pressure/congestion and goiter.    Eyes: Negative for discharge and itching.   Respiratory: Negative for cough, shortness of breath and wheezing.    Cardiovascular: Negative for chest pain and leg swelling.   Gastrointestinal: Negative for abdominal pain, change in bowel habit and change in bowel habit.   Genitourinary: Negative for difficulty urinating, dysuria, enuresis, frequency, hematuria and urgency.   Neurological: Negative for dizziness, vertigo, syncope, weakness and headaches.   Psychiatric/Behavioral: Negative for decreased concentration.        Medical / Social / Family History     Past Medical History:   Diagnosis Date    Encounter for screening for COVID-19 2021    Exposure to COVID-19 virus 2021    GERD (gastroesophageal reflux disease)     Hypertension     Nonintractable headache 2021    Pleurisy 10/29/2021       Past Surgical History:   Procedure Laterality Date    CHOLECYSTECTOMY      HYSTERECTOMY      KNEE SURGERY Left        Social History  Ms.  reports that she has never smoked. She has never used smokeless tobacco. She reports previous alcohol use. She reports that she does not use  "drugs.    Family History  Ms.'s family history is not on file.    Medications and Allergies     Medications  Outpatient Medications Marked as Taking for the 3/29/22 encounter (Office Visit) with Sarah Dacosta NP   Medication Sig Dispense Refill    aspirin (ECOTRIN) 81 MG EC tablet Take 81 mg by mouth once daily.      BYSTOLIC 10 mg Tab Take 1 tablet by mouth once daily.      pantoprazole (PROTONIX) 40 MG tablet Take 1 tablet by mouth once daily.      vitamin A/vit C/zinc/propolis (ZINC WITH VITAMINS A AND C ORAL) Take 1 tablet by mouth once daily.      [DISCONTINUED] ergocalciferol (ERGOCALCIFEROL) 50,000 unit Cap Take 1 capsule (50,000 Units total) by mouth every 30 days. 3 capsule 1       Allergies  Review of patient's allergies indicates:   Allergen Reactions    Keflex [cephalexin]     Neosporin [neomycin-bacitracnzn-polymyxnb]     Pcn [penicillins]     Reglan [metoclopramide]        Physical Examination     Vitals:    03/29/22 0911   BP: 120/74   BP Location: Left arm   Patient Position: Sitting   BP Method: Medium (Manual)   Pulse: 68   Resp: 18   Temp: 98.2 °F (36.8 °C)   TempSrc: Oral   SpO2: 97%   Weight: (!) 148.6 kg (327 lb 8 oz)   Height: 5' 7" (1.702 m)      Physical Exam  Constitutional:       Appearance: Normal appearance. She is obese.   HENT:      Head: Normocephalic.      Right Ear: Tympanic membrane, ear canal and external ear normal.      Left Ear: Tympanic membrane, ear canal and external ear normal.      Nose: Nose normal.      Mouth/Throat:      Mouth: Mucous membranes are moist.      Pharynx: Oropharynx is clear.   Eyes:      Extraocular Movements: Extraocular movements intact.      Conjunctiva/sclera: Conjunctivae normal.      Pupils: Pupils are equal, round, and reactive to light.   Cardiovascular:      Rate and Rhythm: Normal rate and regular rhythm.      Pulses: Normal pulses.      Heart sounds: Normal heart sounds.   Pulmonary:      Effort: Pulmonary effort is normal.      " Breath sounds: Normal breath sounds.   Chest:   Breasts:      Right: Normal. No swelling, bleeding, inverted nipple, mass, nipple discharge, skin change or tenderness.      Left: Normal. No swelling, bleeding, inverted nipple, mass, nipple discharge, skin change or tenderness.       Abdominal:      General: Bowel sounds are normal.      Palpations: Abdomen is soft.   Genitourinary:     General: Normal vulva.      Vagina: No vaginal discharge.      Rectum: Normal.      Comments: Vaginal smear obtained  Musculoskeletal:         General: Normal range of motion.   Skin:     General: Skin is warm and dry.      Capillary Refill: Capillary refill takes less than 2 seconds.   Neurological:      General: No focal deficit present.      Mental Status: She is alert and oriented to person, place, and time.   Psychiatric:         Mood and Affect: Mood normal.         Behavior: Behavior normal.          Assessment and Plan (including Health Maintenance)      Problem List  Smart Sets  Document Outside HM   :    Plan: exercise daily, meds as ordered, eat well balanced meals, keep all scheduled appointments    Screening for lipoid disorders  -     Lipid Panel; Future; Expected date: 03/29/2022    Screening for diabetes mellitus  -     Glucose, Fasting; Future; Expected date: 03/29/2022    Vitamin D deficiency  -     ergocalciferol (ERGOCALCIFEROL) 50,000 unit Cap; Take 1 capsule (50,000 Units total) by mouth every 30 days.  Dispense: 3 capsule; Refill: 0  -     Vitamin D; Future; Expected date: 03/29/2022    Encounter for screening for cervical cancer  -     ThinPrep Pap Test; Future; Expected date: 03/29/2022    Visit for preventive health examination            Health Maintenance Due   Topic Date Due    Hepatitis C Screening  Never done    COVID-19 Vaccine (1) Never done    HIV Screening  Never done    TETANUS VACCINE  Never done    Influenza Vaccine (1) Never done       Problem List Items Addressed This Visit        Endocrine     Vitamin D deficiency    Relevant Medications    ergocalciferol (ERGOCALCIFEROL) 50,000 unit Cap    Other Relevant Orders    Vitamin D      Other Visit Diagnoses     Screening for lipoid disorders    -  Primary    Relevant Orders    Lipid Panel    Screening for diabetes mellitus        Relevant Orders    Glucose, Fasting    Encounter for screening for cervical cancer        Relevant Orders    ThinPrep Pap Test    Visit for preventive health examination                Health Maintenance Topics with due status: Not Due       Topic Last Completion Date    Mammogram 09/13/2021       Procedures     Future Appointments   Date Time Provider Department Center   9/19/2022  9:30 AM [facility]   9/29/2022  8:15 AM Sarah Dacosta NP [facility]   3/29/2023  8:00 AM Sarah Dacosta NP [facility]        Follow up in about 6 months (around 9/29/2022).       Signature:  Sarah Dacosta NP    Date of encounter: 3/29/22

## 2022-04-01 LAB
GH SERPL-MCNC: NORMAL NG/ML
INSULIN SERPL-ACNC: NORMAL U[IU]/ML
LAB AP CLINICAL INFORMATION: NORMAL
LAB AP GYN INTERPRETATION: NEGATIVE
LAB AP PAP DISCLAIMER COMMENTS: NORMAL
RENIN PLAS-CCNC: NORMAL NG/ML/H

## 2022-04-04 NOTE — PROGRESS NOTES
Vit d is sl low, cont meds and recheck in 6 months, triglycerides sl elevated, watcj low fat low chol diet, exercise daily and lose wt.

## 2022-07-04 PROBLEM — Z13.1 SCREENING FOR DIABETES MELLITUS: Status: RESOLVED | Noted: 2022-03-29 | Resolved: 2022-07-04

## 2022-07-04 PROBLEM — Z00.00 VISIT FOR PREVENTIVE HEALTH EXAMINATION: Status: RESOLVED | Noted: 2022-03-29 | Resolved: 2022-07-04

## 2022-08-08 ENCOUNTER — OFFICE VISIT (OUTPATIENT)
Dept: FAMILY MEDICINE | Facility: CLINIC | Age: 42
End: 2022-08-08
Payer: COMMERCIAL

## 2022-08-08 VITALS
BODY MASS INDEX: 45.99 KG/M2 | HEART RATE: 90 BPM | HEIGHT: 67 IN | OXYGEN SATURATION: 96 % | TEMPERATURE: 98 F | WEIGHT: 293 LBS | DIASTOLIC BLOOD PRESSURE: 84 MMHG | RESPIRATION RATE: 18 BRPM | SYSTOLIC BLOOD PRESSURE: 132 MMHG

## 2022-08-08 DIAGNOSIS — I10 HYPERTENSION, UNSPECIFIED TYPE: Primary | ICD-10-CM

## 2022-08-08 DIAGNOSIS — E55.9 VITAMIN D DEFICIENCY: ICD-10-CM

## 2022-08-08 DIAGNOSIS — M25.511 ACUTE PAIN OF RIGHT SHOULDER: ICD-10-CM

## 2022-08-08 PROCEDURE — 3079F DIAST BP 80-89 MM HG: CPT | Mod: ,,, | Performed by: NURSE PRACTITIONER

## 2022-08-08 PROCEDURE — 99214 OFFICE O/P EST MOD 30 MIN: CPT | Mod: 25,,, | Performed by: NURSE PRACTITIONER

## 2022-08-08 PROCEDURE — 3075F PR MOST RECENT SYSTOLIC BLOOD PRESS GE 130-139MM HG: ICD-10-PCS | Mod: ,,, | Performed by: NURSE PRACTITIONER

## 2022-08-08 PROCEDURE — 3008F BODY MASS INDEX DOCD: CPT | Mod: ,,, | Performed by: NURSE PRACTITIONER

## 2022-08-08 PROCEDURE — 1159F MED LIST DOCD IN RCRD: CPT | Mod: ,,, | Performed by: NURSE PRACTITIONER

## 2022-08-08 PROCEDURE — 96372 PR INJECTION,THERAP/PROPH/DIAG2ST, IM OR SUBCUT: ICD-10-PCS | Mod: ,,, | Performed by: NURSE PRACTITIONER

## 2022-08-08 PROCEDURE — 3075F SYST BP GE 130 - 139MM HG: CPT | Mod: ,,, | Performed by: NURSE PRACTITIONER

## 2022-08-08 PROCEDURE — 3008F PR BODY MASS INDEX (BMI) DOCUMENTED: ICD-10-PCS | Mod: ,,, | Performed by: NURSE PRACTITIONER

## 2022-08-08 PROCEDURE — 99214 PR OFFICE/OUTPT VISIT, EST, LEVL IV, 30-39 MIN: ICD-10-PCS | Mod: 25,,, | Performed by: NURSE PRACTITIONER

## 2022-08-08 PROCEDURE — 96372 THER/PROPH/DIAG INJ SC/IM: CPT | Mod: ,,, | Performed by: NURSE PRACTITIONER

## 2022-08-08 PROCEDURE — 1159F PR MEDICATION LIST DOCUMENTED IN MEDICAL RECORD: ICD-10-PCS | Mod: ,,, | Performed by: NURSE PRACTITIONER

## 2022-08-08 PROCEDURE — 3079F PR MOST RECENT DIASTOLIC BLOOD PRESSURE 80-89 MM HG: ICD-10-PCS | Mod: ,,, | Performed by: NURSE PRACTITIONER

## 2022-08-08 RX ORDER — CYCLOBENZAPRINE HCL 10 MG
10 TABLET ORAL 3 TIMES DAILY PRN
Qty: 30 TABLET | Refills: 0 | Status: SHIPPED | OUTPATIENT
Start: 2022-08-08 | End: 2022-08-18

## 2022-08-08 RX ORDER — METHYLPREDNISOLONE ACETATE 40 MG/ML
40 INJECTION, SUSPENSION INTRA-ARTICULAR; INTRALESIONAL; INTRAMUSCULAR; SOFT TISSUE
Status: COMPLETED | OUTPATIENT
Start: 2022-08-08 | End: 2022-08-08

## 2022-08-08 RX ORDER — DICLOFENAC SODIUM 75 MG/1
75 TABLET, DELAYED RELEASE ORAL 2 TIMES DAILY
Qty: 30 TABLET | Refills: 1 | Status: SHIPPED | OUTPATIENT
Start: 2022-08-08 | End: 2022-09-09

## 2022-08-08 RX ORDER — DEXAMETHASONE SODIUM PHOSPHATE 4 MG/ML
4 INJECTION, SOLUTION INTRA-ARTICULAR; INTRALESIONAL; INTRAMUSCULAR; INTRAVENOUS; SOFT TISSUE
Status: COMPLETED | OUTPATIENT
Start: 2022-08-08 | End: 2022-08-08

## 2022-08-08 RX ORDER — ERGOCALCIFEROL 1.25 MG/1
50000 CAPSULE ORAL
Qty: 3 CAPSULE | Refills: 1 | Status: SHIPPED | OUTPATIENT
Start: 2022-08-08 | End: 2022-12-27 | Stop reason: SDUPTHER

## 2022-08-08 RX ADMIN — METHYLPREDNISOLONE ACETATE 40 MG: 40 INJECTION, SUSPENSION INTRA-ARTICULAR; INTRALESIONAL; INTRAMUSCULAR; SOFT TISSUE at 02:08

## 2022-08-08 RX ADMIN — DEXAMETHASONE SODIUM PHOSPHATE 4 MG: 4 INJECTION, SOLUTION INTRA-ARTICULAR; INTRALESIONAL; INTRAMUSCULAR; INTRAVENOUS; SOFT TISSUE at 02:08

## 2022-08-08 NOTE — PROGRESS NOTES
"   Sarah Dacosta NP   [facility]  [address]  [place]     PATIENT NAME: Komal Busch  : 1980  DATE: 22  MRN: [id number]      Billing Provider: Sarah Dacosta NP  Level of Service:   Patient PCP Information     Provider PCP Type    Sarah Dacosta NP General          Reason for Visit / Chief Complaint: Chest Pain (X 2 weeks. Felt like pleurisy, but has moved to right shoulder. "Knot" to right shoulder. )       Update PCP  Update Chief Complaint         History of Present Illness / Problem Focused Workflow     Komal Busch presents to the clinic c/o chest pain x 2 weeks, felt like pleurisy but has moved to right shoulder, knot to right shoulder      Review of Systems     Review of Systems     Medical / Social / Family History     Past Medical History:   Diagnosis Date    Encounter for screening for COVID-19 2021    Exposure to COVID-19 virus 2021    GERD (gastroesophageal reflux disease)     Hypertension     Nonintractable headache 2021    Pleurisy 10/29/2021       Past Surgical History:   Procedure Laterality Date    CHOLECYSTECTOMY      HYSTERECTOMY      KNEE SURGERY Left        Social History  Ms.  reports that she has never smoked. She has never used smokeless tobacco. She reports previous alcohol use. She reports that she does not use drugs.    Family History  Ms.'s family history is not on file.    Medications and Allergies     Medications  Outpatient Medications Marked as Taking for the 22 encounter (Office Visit) with Sarah Dacosta NP   Medication Sig Dispense Refill    aspirin (ECOTRIN) 81 MG EC tablet Take 81 mg by mouth once daily.      BYSTOLIC 10 mg Tab Take 20 mg by mouth once daily.      ergocalciferol (ERGOCALCIFEROL) 50,000 unit Cap Take 1 capsule (50,000 Units total) by mouth every 30 days. 3 capsule 0    neomycin-polymyxin-dexamethasone (DEXACINE) 3.5 mg/g-10,000 unit/g-0.1 % Oint Place 1 drop into both eyes daily as " "needed.      pantoprazole (PROTONIX) 40 MG tablet Take 1 tablet by mouth once daily.      vitamin A/vit C/zinc/propolis (ZINC WITH VITAMINS A AND C ORAL) Take 1 tablet by mouth once daily.         Allergies  Review of patient's allergies indicates:   Allergen Reactions    Keflex [cephalexin]     Neosporin [neomycin-bacitracnzn-polymyxnb]     Pcn [penicillins]     Reglan [metoclopramide]        Physical Examination     Vitals:    08/08/22 1324   BP: 132/84   BP Location: Left arm   Patient Position: Sitting   Pulse: 90   Resp: 18   Temp: 98.4 °F (36.9 °C)   TempSrc: Oral   SpO2: 96%   Weight: (!) 148 kg (326 lb 3.2 oz)   Height: 5' 7" (1.702 m)      Physical Exam     Assessment and Plan (including Health Maintenance)      Problem List  Smart [employer]  Document Outside HM   :    Plan:     Hypertension, unspecified type  -     Lipid Panel; Future; Expected date: 08/08/2022  -     Microalbumin/Creatinine Ratio, Urine; Future; Expected date: 08/08/2022            Health Maintenance Due   Topic Date Due    Hepatitis C Screening  Never done    COVID-19 Vaccine (1) Never done    HIV Screening  Never done    TETANUS VACCINE  Never done    Mammogram  09/13/2022       Problem List Items Addressed This Visit        Cardiac/Vascular    Hypertension - Primary    Relevant Orders    Lipid Panel    Microalbumin/Creatinine Ratio, Urine            Health Maintenance Topics with due status: Not Due       Topic Last Completion Date    Influenza Vaccine Not Due       Procedures     Future Appointments   Date Time Provider Department Center   9/19/2022  9:30 AM [facility]   9/29/2022  8:15 AM Sarah Dacosta NP [facility]   3/29/2023  8:00 AM Sarah Dacosta NP [facility]        No follow-ups on file.       Signature:  Sarah Dacosta NP    Date of encounter: 8/8/22        "

## 2022-10-10 ENCOUNTER — HOSPITAL ENCOUNTER (OUTPATIENT)
Dept: RADIOLOGY | Facility: HOSPITAL | Age: 42
Discharge: HOME | End: 2022-10-10
Payer: COMMERCIAL

## 2022-10-10 DIAGNOSIS — Z12.31 BREAST CANCER SCREENING BY MAMMOGRAM: ICD-10-CM

## 2022-10-10 PROCEDURE — 77067 SCR MAMMO BI INCL CAD: CPT | Mod: TC

## 2022-11-23 ENCOUNTER — HOSPITAL ENCOUNTER (EMERGENCY)
Facility: HOSPITAL | Age: 42
Discharge: HOME | End: 2022-11-23
Payer: COMMERCIAL

## 2022-11-23 VITALS
HEIGHT: 67 IN | HEART RATE: 82 BPM | RESPIRATION RATE: 18 BRPM | SYSTOLIC BLOOD PRESSURE: 123 MMHG | BODY MASS INDEX: 45.99 KG/M2 | OXYGEN SATURATION: 99 % | TEMPERATURE: 99 F | WEIGHT: 293 LBS | DIASTOLIC BLOOD PRESSURE: 65 MMHG

## 2022-11-23 DIAGNOSIS — J10.1 INFLUENZA A: Primary | ICD-10-CM

## 2022-11-23 LAB
FLUAV AG UPPER RESP QL IA.RAPID: POSITIVE
FLUBV AG UPPER RESP QL IA.RAPID: NEGATIVE

## 2022-11-23 PROCEDURE — 99284 PR EMERGENCY DEPT VISIT,LEVEL IV: ICD-10-PCS | Mod: ,,, | Performed by: NURSE PRACTITIONER

## 2022-11-23 PROCEDURE — 99284 EMERGENCY DEPT VISIT MOD MDM: CPT

## 2022-11-23 PROCEDURE — 87804 INFLUENZA ASSAY W/OPTIC: CPT | Performed by: NURSE PRACTITIONER

## 2022-11-23 PROCEDURE — 99284 EMERGENCY DEPT VISIT MOD MDM: CPT | Mod: ,,, | Performed by: NURSE PRACTITIONER

## 2022-11-23 RX ORDER — OSELTAMIVIR PHOSPHATE 75 MG/1
75 CAPSULE ORAL 2 TIMES DAILY
Qty: 10 CAPSULE | Refills: 0 | Status: SHIPPED | OUTPATIENT
Start: 2022-11-23 | End: 2022-11-28

## 2022-11-23 RX ORDER — ONDANSETRON 4 MG/1
4 TABLET, ORALLY DISINTEGRATING ORAL EVERY 6 HOURS PRN
Qty: 30 TABLET | Refills: 0 | Status: SHIPPED | OUTPATIENT
Start: 2022-11-23 | End: 2022-12-27

## 2022-11-23 NOTE — ED PROVIDER NOTES
Encounter Date: 11/23/2022       History     Chief Complaint   Patient presents with    Influenza     Patient is a 41 y/o WF who presents to the ED with complaint of headache, fever, body aches and cough the started yesterday. Tmax 101.5. Has been treating with tylenol. Also having few loose stools and nausea today. Took a home COVID test this morning which was negative.   Has not taken BP medication this morning.    The history is provided by the patient.   Review of patient's allergies indicates:   Allergen Reactions    Keflex [cephalexin]     Neosporin [neomycin-bacitracnzn-polymyxnb]     Pcn [penicillins]     Reglan [metoclopramide]      Past Medical History:   Diagnosis Date    Encounter for screening for COVID-19 9/29/2021    Exposure to COVID-19 virus 7/28/2021    GERD (gastroesophageal reflux disease)     Hypertension     Nonintractable headache 8/16/2021    Pleurisy 10/29/2021     Past Surgical History:   Procedure Laterality Date    CHOLECYSTECTOMY      HYSTERECTOMY      KNEE SURGERY Left      History reviewed. No pertinent family history.  Social History     Tobacco Use    Smoking status: Never    Smokeless tobacco: Never   Substance Use Topics    Alcohol use: Not Currently    Drug use: Never     Review of Systems   Constitutional:  Positive for activity change (decreased) and appetite change (decreased).   HENT:  Positive for congestion and postnasal drip. Negative for sinus pressure, sinus pain and sore throat.    Eyes: Negative.    Respiratory:  Positive for cough. Negative for shortness of breath and wheezing.    Cardiovascular: Negative.    Gastrointestinal:  Positive for diarrhea and nausea. Negative for abdominal pain and vomiting.   Genitourinary: Negative.  Negative for dysuria and frequency.   Musculoskeletal:  Positive for myalgias (generalized body aches).   Skin: Negative.  Negative for rash.   Neurological:  Positive for headaches. Negative for dizziness, weakness and light-headedness.    Hematological: Negative.    Psychiatric/Behavioral: Negative.       Physical Exam     Initial Vitals [11/23/22 1045]   BP Pulse Resp Temp SpO2   (!) 180/87 94 16 98.6 °F (37 °C) (!) 94 %      MAP       --         Physical Exam    Nursing note and vitals reviewed.  Constitutional: She appears well-developed and well-nourished. She is Obese . She is cooperative.  Non-toxic appearance. She does not have a sickly appearance. She appears ill. No distress.   HENT:   Head: Normocephalic and atraumatic.   Right Ear: Hearing, tympanic membrane, external ear and ear canal normal.   Left Ear: Hearing, tympanic membrane, external ear and ear canal normal.   Nose: Nose normal.   Mouth/Throat: Uvula is midline, oropharynx is clear and moist and mucous membranes are normal.   Eyes: Conjunctivae and lids are normal. Pupils are equal, round, and reactive to light.   Neck: Neck supple.   Normal range of motion.  Cardiovascular:  Normal rate, regular rhythm, intact distal pulses and normal pulses.           No murmur heard.  Pulmonary/Chest: Effort normal and breath sounds normal. She has no wheezes. She has no rhonchi. She has no rales.   Abdominal: Abdomen is soft. Bowel sounds are normal. There is no abdominal tenderness.   Musculoskeletal:         General: Normal range of motion.      Cervical back: Normal range of motion and neck supple.     Neurological: She is alert and oriented to person, place, and time.   Skin: Skin is warm and dry. Capillary refill takes less than 2 seconds. No rash noted.   Psychiatric: She has a normal mood and affect. Her behavior is normal. Judgment and thought content normal.       Medical Screening Exam   See Full Note    ED Course   Procedures  Labs Reviewed   RAPID INFLUENZA A/B - Abnormal; Notable for the following components:       Result Value    Influenza A Positive (*)     All other components within normal limits          Imaging Results    None          Medications - No data to  display  Medical Decision Making:   Clinical Tests:   Lab Tests: Ordered and Reviewed       <> Summary of Lab: Flu B negative. Flu A positive  ED Management:  Prescription given for Tamiflu. Reviewed discharge instructions and copy of AVS given to patient. She agreed to treatment plan and verbalized understanding.                  Clinical Impression:   Final diagnoses:  [J10.1] Influenza A (Primary)        ED Disposition Condition    Discharge Stable          ED Prescriptions       Medication Sig Dispense Start Date End Date Auth. Provider    oseltamivir (TAMIFLU) 75 MG capsule Take 1 capsule (75 mg total) by mouth 2 (two) times daily. for 5 days 10 capsule 11/23/2022 11/28/2022 HAMZAH Moise    ondansetron (ZOFRAN-ODT) 4 MG TbDL Take 1 tablet (4 mg total) by mouth every 6 (six) hours as needed (nausea or vomiting). 30 tablet 11/23/2022 -- HAMZAH Moise          Follow-up Information       Follow up With Specialties Details Why Contact Info    Sarah Dacosta NP Family Medicine  If symptoms worsen [address] [address] [facility] [address]  [facility] [place] [address] [phone]               HAMZAH Moise  11/23/22 9807

## 2022-11-23 NOTE — ED TRIAGE NOTES
Presents with c/o  headache, bodyaches, fever and cough since yesterday.   tried to see her PCP and they were booked up for today.   took a home Covid test and it was negative.  States she feels like she has the flu

## 2022-11-25 ENCOUNTER — TELEPHONE (OUTPATIENT)
Dept: EMERGENCY MEDICINE | Facility: HOSPITAL | Age: 42
End: 2022-11-25
Payer: COMMERCIAL

## 2022-11-25 NOTE — TELEPHONE ENCOUNTER
NOT FEELING MUCH BETTER. ENCOURAGED TYLENOL AND MOTRIN FOR FEVER CONTROL AND OTC MEDS FOR SYMPTOMS. AND TO FU WITH PCP ON Monday. AND TO RETURN TO ER IF HAD ANY RESP DISTRESS.

## 2022-12-27 ENCOUNTER — OFFICE VISIT (OUTPATIENT)
Dept: FAMILY MEDICINE | Facility: CLINIC | Age: 42
End: 2022-12-27
Payer: COMMERCIAL

## 2022-12-27 VITALS
RESPIRATION RATE: 18 BRPM | HEIGHT: 67 IN | DIASTOLIC BLOOD PRESSURE: 80 MMHG | HEART RATE: 62 BPM | TEMPERATURE: 98 F | WEIGHT: 293 LBS | SYSTOLIC BLOOD PRESSURE: 118 MMHG | OXYGEN SATURATION: 98 % | BODY MASS INDEX: 45.99 KG/M2

## 2022-12-27 DIAGNOSIS — Z13.1 SCREENING FOR DIABETES MELLITUS: Primary | ICD-10-CM

## 2022-12-27 DIAGNOSIS — Z00.00 VISIT FOR PREVENTIVE HEALTH EXAMINATION: ICD-10-CM

## 2022-12-27 DIAGNOSIS — Z13.220 SCREENING FOR LIPOID DISORDERS: ICD-10-CM

## 2022-12-27 DIAGNOSIS — E55.9 VITAMIN D DEFICIENCY: ICD-10-CM

## 2022-12-27 PROCEDURE — 1159F PR MEDICATION LIST DOCUMENTED IN MEDICAL RECORD: ICD-10-PCS | Mod: ,,, | Performed by: NURSE PRACTITIONER

## 2022-12-27 PROCEDURE — 3079F PR MOST RECENT DIASTOLIC BLOOD PRESSURE 80-89 MM HG: ICD-10-PCS | Mod: ,,, | Performed by: NURSE PRACTITIONER

## 2022-12-27 PROCEDURE — 3008F BODY MASS INDEX DOCD: CPT | Mod: ,,, | Performed by: NURSE PRACTITIONER

## 2022-12-27 PROCEDURE — 3008F PR BODY MASS INDEX (BMI) DOCUMENTED: ICD-10-PCS | Mod: ,,, | Performed by: NURSE PRACTITIONER

## 2022-12-27 PROCEDURE — 3074F PR MOST RECENT SYSTOLIC BLOOD PRESSURE < 130 MM HG: ICD-10-PCS | Mod: ,,, | Performed by: NURSE PRACTITIONER

## 2022-12-27 PROCEDURE — 3079F DIAST BP 80-89 MM HG: CPT | Mod: ,,, | Performed by: NURSE PRACTITIONER

## 2022-12-27 PROCEDURE — 99396 PREV VISIT EST AGE 40-64: CPT | Mod: ,,, | Performed by: NURSE PRACTITIONER

## 2022-12-27 PROCEDURE — 99396 PR PREVENTIVE VISIT,EST,40-64: ICD-10-PCS | Mod: ,,, | Performed by: NURSE PRACTITIONER

## 2022-12-27 PROCEDURE — 3074F SYST BP LT 130 MM HG: CPT | Mod: ,,, | Performed by: NURSE PRACTITIONER

## 2022-12-27 PROCEDURE — 1159F MED LIST DOCD IN RCRD: CPT | Mod: ,,, | Performed by: NURSE PRACTITIONER

## 2022-12-27 RX ORDER — CELECOXIB 200 MG/1
200 CAPSULE ORAL DAILY PRN
COMMUNITY
Start: 2022-12-01 | End: 2022-12-27

## 2022-12-27 RX ORDER — ERGOCALCIFEROL 1.25 MG/1
50000 CAPSULE ORAL
Qty: 3 CAPSULE | Refills: 1 | Status: SHIPPED | OUTPATIENT
Start: 2022-12-27 | End: 2024-01-05

## 2022-12-27 NOTE — PROGRESS NOTES
Sarah Dacosta NP   [facility]  [address]  [place]     PATIENT NAME: Komal Busch  : 1980  DATE: 22  MRN: [id number]      Billing Provider: Sarah Dacosta NP  Level of Service: [place] PREVENTIVE VISIT,EST,40-64  Patient PCP Information       Provider PCP Type    Sarah Dacosta NP General            Reason for Visit / Chief Complaint: Healthy You ([employer] [place] YAQ HY: LIPID/ GLUCOSE/Will RTC this week for fasting labs. )       Update PCP  Update Chief Complaint         History of Present Illness / Problem Focused Workflow     Komal Busch presents to the clinic Here for [employer] heatlhy you. Will return for fasting labs, voices n/c      Review of Systems     Review of Systems   Constitutional:  Negative for chills, fatigue and fever.   HENT:  Negative for nasal congestion, ear pain, facial swelling, hearing loss, mouth dryness, mouth sores, postnasal drip, rhinorrhea, sinus pressure/congestion and goiter.    Eyes:  Negative for discharge and itching.   Respiratory:  Negative for cough, shortness of breath and wheezing.    Cardiovascular:  Negative for chest pain and leg swelling.   Gastrointestinal:  Negative for abdominal pain, change in bowel habit and change in bowel habit.   Genitourinary:  Negative for difficulty urinating, dysuria, enuresis, frequency, hematuria and urgency.   Neurological:  Negative for dizziness, vertigo, syncope, weakness and headaches.   Psychiatric/Behavioral:  Negative for decreased concentration.    All other systems reviewed and are negative.     Medical / Social / Family History     Past Medical History:   Diagnosis Date    Encounter for screening for COVID-19 2021    Exposure to COVID-19 virus 2021    GERD (gastroesophageal reflux disease)     Hypertension     Nonintractable headache 2021    Pleurisy 10/29/2021       Past Surgical History:   Procedure Laterality Date    CHOLECYSTECTOMY      HYSTERECTOMY      KNEE SURGERY Left   "      Social History  Ms.  reports that she has never smoked. She has never used smokeless tobacco. She reports that she does not currently use alcohol. She reports that she does not use drugs.    Family History  Ms.'s family history is not on file.    Medications and Allergies     Medications  Outpatient Medications Marked as Taking for the 12/27/22 encounter (Office Visit) with Sarah Dacosta NP   Medication Sig Dispense Refill    aspirin (ECOTRIN) 81 MG EC tablet Take 81 mg by mouth once daily.      BYSTOLIC 10 mg Tab Take 20 mg by mouth once daily.      neomycin-polymyxin-dexamethasone (DEXACINE) 3.5 mg/g-10,000 unit/g-0.1 % Oint Place 1 drop into both eyes daily as needed.      pantoprazole (PROTONIX) 40 MG tablet Take 1 tablet by mouth once daily.      [DISCONTINUED] ergocalciferol (ERGOCALCIFEROL) 50,000 unit Cap Take 1 capsule (50,000 Units total) by mouth every 30 days. 3 capsule 1       Allergies  Review of patient's allergies indicates:   Allergen Reactions    Keflex [cephalexin]     Neosporin [neomycin-bacitracnzn-polymyxnb]     Pcn [penicillins]     Reglan [metoclopramide]        Physical Examination     Vitals:    12/27/22 1340   BP: 118/80   BP Location: Left arm   Patient Position: Sitting   Pulse: 62   Resp: 18   Temp: 97.9 °F (36.6 °C)   TempSrc: Oral   SpO2: 98%   Weight: (!) 144.5 kg (318 lb 9.6 oz)   Height: 5' 7" (1.702 m)      Physical Exam  Constitutional:       Appearance: Normal appearance. She is obese.   HENT:      Head: Normocephalic.      Right Ear: Tympanic membrane, ear canal and external ear normal.      Left Ear: Tympanic membrane, ear canal and external ear normal.      Nose: Nose normal.      Mouth/Throat:      Mouth: Mucous membranes are moist.      Pharynx: Oropharynx is clear.   Eyes:      Extraocular Movements: Extraocular movements intact.      Conjunctiva/sclera: Conjunctivae normal.      Pupils: Pupils are equal, round, and reactive to light.   Cardiovascular:      Rate " and Rhythm: Normal rate and regular rhythm.      Pulses: Normal pulses.      Heart sounds: Normal heart sounds.   Pulmonary:      Effort: Pulmonary effort is normal.      Breath sounds: Normal breath sounds.   Abdominal:      General: Bowel sounds are normal.      Palpations: Abdomen is soft.   Musculoskeletal:         General: Normal range of motion.   Skin:     General: Skin is warm and dry.      Capillary Refill: Capillary refill takes less than 2 seconds.   Neurological:      General: No focal deficit present.      Mental Status: She is alert and oriented to person, place, and time.   Psychiatric:         Mood and Affect: Mood normal.         Behavior: Behavior normal.        Assessment and Plan (including Health Maintenance)      Problem List  Smart Sets  Document Outside HM   :    Plan: exercise daily, meds as ordered adhere to low carb diet, keep all scheduled appl..     Screening for diabetes mellitus  -     Hemoglobin A1C; Future; Expected date: 12/27/2022  -     Glucose, Fasting; Future; Expected date: 12/27/2022    Vitamin D deficiency  -     ergocalciferol (ERGOCALCIFEROL) 50,000 unit Cap; Take 1 capsule (50,000 Units total) by mouth every 30 days.  Dispense: 3 capsule; Refill: 1    Screening for lipoid disorders  -     Lipid Panel; Future; Expected date: 12/27/2022            Health Maintenance Due   Topic Date Due    Hepatitis C Screening  Never done    HIV Screening  Never done    TETANUS VACCINE  Never done    Hemoglobin A1c (Diabetic Prevention Screening)  Never done       Problem List Items Addressed This Visit          Endocrine    Vitamin D deficiency    Relevant Medications    ergocalciferol (ERGOCALCIFEROL) 50,000 unit Cap     Other Visit Diagnoses       Screening for diabetes mellitus    -  Primary    Relevant Orders    Hemoglobin A1C    Glucose, Fasting    Screening for lipoid disorders        Relevant Orders    Lipid Panel              Health Maintenance Topics with due status: Not Due        Topic Last Completion Date    Mammogram 10/10/2022       Procedures     Future Appointments   Date Time Provider Department Center   10/16/2023  8:30 AM [facility]   12/26/2023 10:30 AM Sarah Dacosta NP [facility]        Follow up in 1 year (on 12/27/2023) for Yearly Wellness Visit.       Signature:  Sarah Dacosta NP    Date of encounter: 12/27/22

## 2022-12-27 NOTE — PATIENT INSTRUCTIONS
"Patient Education       High Blood Pressure in Adults   The Basics   Written by the doctors and editors at [facility]   What is high blood pressure? -- High blood pressure is a condition that puts you at risk for heart attack, stroke, and kidney disease. It does not usually cause symptoms. But it can be serious.  When your doctor or nurse tells you your blood pressure, they will say 2 numbers. For instance, your doctor or nurse might say that your blood pressure is "130 over 80." The top number is the pressure inside your arteries when your heart is shaw. The bottom number is the pressure inside your arteries when your heart is relaxed.  "Elevated blood pressure" is a term doctors or nurses use as a warning. People with elevated blood pressure do not yet have high blood pressure. But their blood pressure is not as low as it should be for good health.  Many experts define high, elevated, and normal blood pressure as follows:  High - Top number of 130 or above and/or bottom number of 80 or above  Elevated - Top number between 120 and 129 and bottom number of 79 or below  Normal - Top number of 119 or below and bottom number of 79 or below  This information is also in the table (table 1).   How can I lower my blood pressure? -- If your doctor or nurse has prescribed blood pressure medicine, the most important thing you can do is to take it. If it causes side effects, do not just stop taking it. Instead, talk to your doctor or nurse about the problems it causes. They might be able to lower your dose or switch you to another medicine. If cost is a problem, mention that too. They might be able to put you on a less expensive medicine. Taking your blood pressure medicine can keep you from having a heart attack or stroke, and it can save your life!  Can I do anything on my own? -- You have a lot of control over your blood pressure. To lower it:  Lose weight (if you are overweight)  Choose a diet low in fat and rich in " "fruits, vegetables, and low-fat dairy products  Reduce the amount of salt you eat  Do something active for at least 30 minutes a day on most days of the week  Cut down on alcohol (if you drink more than 2 alcoholic drinks per day)  It's also a good idea to get a home blood pressure meter. People who check their own blood pressure at home do better at keeping it low and can sometimes even reduce the amount of medicine they take.  All topics are updated as new evidence becomes available and our peer review process is complete.  This topic retrieved from GetSet on: Sep 21, 2021.  Topic 33826 Version 15.0  Release: 29.4.2 - C29.263  © 2021 UpToDate, Inc. and/or its affiliates. All rights reserved.  table 1: Definition of normal and high blood pressure  Level  Top number  Bottom number    High 130 or above 80 or above   Elevated 120 to 129 79 or below   Normal 119 or below 79 or below   These definitions are from the American College of Cardiology/American Heart Association. Other expert groups might use slightly different definitions.  "Elevated blood pressure" is a term doctor or nurses use as a warning. It means you do not yet have high blood pressure, but your blood pressure is not as low as it should be for good health.  Graphic 62830 Version 6.0  Consumer Information Use and Disclaimer   This information is not specific medical advice and does not replace information you receive from your health care provider. This is only a brief summary of general information. It does NOT include all information about conditions, illnesses, injuries, tests, procedures, treatments, therapies, discharge instructions or life-style choices that may apply to you. You must talk with your health care provider for complete information about your health and treatment options. This information should not be used to decide whether or not to accept your health care provider's advice, instructions or recommendations. Only your health care " provider has the knowledge and training to provide advice that is right for you. The use of this information is governed by the [employer] End User License Agreement, available at https://www.Azuki Systems.[employer] Ultimate Rel[employer] Network/en/solutions/Taodyne/about/mackenzie.The use of [employer] content is governed by the [employer] Terms of Use. ©2021 UpToDate, Inc. All rights reserved.  Copyright   © 2021 UpToDate, Inc. and/or its affiliates. All rights reserved.    Patient Education       Diet and Health   The Basics   Written by the doctors and editors at [facility]   Why is it important to eat a healthy diet? -- It's important to eat a healthy diet because eating the right foods can keep you healthy now and later on in life.  Which foods are especially healthy? -- Foods that are especially healthy include:  Fruits and vegetables - Eating a diet with lots of fruits and vegetables can help prevent heart disease and strokes. It might also help prevent certain types of cancers. Try to eat fruits and vegetables at each meal and also for snacks. If you don't have fresh fruits and vegetables available, you can eat frozen or canned ones instead. Doctors recommend eating at least 2 1/2 servings of vegetables and 2 servings of fruits each day.  Foods with fiber - Eating foods with a lot of fiber can help prevent heart disease and strokes. If you have type 2 diabetes, it can also help control your blood sugar. Foods that have a lot of fiber include vegetables, fruits, beans, nuts, oatmeal, and whole grain breads and cereals. You can tell how much fiber is in a food by reading the nutrition label (figure 1). Doctors recommend eating 25 to 36 grams of fiber each day.  Foods with folate (also called folic acid) - Folate is a vitamin that is important for pregnant people, since it helps prevent certain birth defects. Anyone who could get pregnant should get at least 400 micrograms of folic acid daily, whether or not they are actively trying to get pregnant. Folate  "is found in many breakfast cereals, oranges, orange juice, and green leafy vegetables.  Foods with calcium and vitamin D - Babies, children, and adults need calcium and vitamin D to help keep their bones strong. Adults also need calcium and vitamin D to help prevent osteoporosis. Osteoporosis is a condition that causes bones to get "thin" and break more easily than usual. Different foods and drinks have calcium and vitamin D in them (figure 2). People who don't get enough calcium and vitamin D in their diet might need to take a supplement.  Foods with protein - Protein helps your muscles stay strong. Healthy foods with a lot of protein include chicken, fish, eggs, beans, nuts, and soy products. Red meat also has a lot of protein, but it also contains fats, which can be unhealthy.  Some experts recommend a "Mediterranean diet." This involves eating a lot of fruits, vegetables, nuts, whole grains, and olive oil. It also includes some fish, poultry, and dairy products, but not a lot of red meat. Eating this way can help your overall health, and might even lower your risk of having a stroke.  What foods should I avoid or limit? -- To eat a healthy diet, there are some things you should avoid or limit. They include:   Fats - There are different types of fats. Some types of fats are better for your body than others.  Trans fats are especially unhealthy. They are found in margarines, many fast foods, and some store-bought baked goods. Trans fats can raise your cholesterol level and your increase your chance of getting heart disease. You should avoid eating foods with these types of fats.  The type of "polyunsaturated" fats found in fish seems to be healthy and can reduce your chance of getting heart disease. Other polyunsaturated fats might also be good for your health. When you cook, it's best to use oils with healthier fats, such as olive oil and canola oil.  Sugar - To have a healthy diet, it's important to limit or " "avoid sugar, sweets, and refined grains. Refined grains are found in white bread, white rice, most forms of pasta, and most packaged "snack" foods. Whole grains, such as whole-wheat bread and brown rice, have more fiber and are better for your health.  Avoiding sugar-sweetened beverages, like soda and sports drinks, can also help improve your health.  Red meat - Studies have shown that eating a lot of red meat can increase your risk of certain health problems, including heart disease and cancer. You should limit the amount of red meat that you eat.  Can I drink alcohol as part of a healthy diet? -- People who drink a small amount of alcohol each day might have a lower chance of getting heart disease. But drinking alcohol can lead to problems. For example, it can raise a person's chances of getting liver disease and certain types of cancers. In women, even 1 drink a day can increase the risk of getting breast cancer.  Most doctors recommend that adult women not have more than 1 drink a day and that adult men not have more than 2 drinks a day. The limits are different because most women's bodies take longer to break down alcohol.  How many calories do I need each day? -- The number of calories you need each day depends on your weight, height, age, sex, and how active you are.  Your doctor or nurse can tell you how many calories you should eat each day. If you are trying to lose weight, you should eat fewer calories each day.  What if I have questions? -- If you have questions about which foods you should or should not eat, ask your doctor or nurse. The right diet for you will depend, in part, on your health and any medical conditions you have.  All topics are updated as new evidence becomes available and our peer review process is complete.  This topic retrieved from [employer] on: Sep 21, 2021.  Topic 05128 Version 20.0  Release: 29.4.2 - C29.263  © 2021 UpToDate, Inc. and/or its affiliates. All rights " "reserved.  figure 1: Nutrition label - fiber     This is an example of a nutrition label. To figure out how much fiber is in a food, look for the line that says "Dietary Fiber." It's also important to look at the serving size. This food has 7 grams of fiber in each serving, and each serving is 1 cup.  Graphic 69578 Version 7.0    figure 2: Foods and drinks with calcium and vitamin D     Foods rich in calcium include ice cream, soy milk, breads, kale, broccoli, milk, cheese, cottage cheese, almonds, yogurt, ready-to-eat cereals, beans, and tofu. Foods rich in vitamin D include milk, fortified plant-based "milks" (soy, almond), canned tuna fish, cod liver oil, yogurt, ready-to-eat-cereals, cooked salmon, canned sardines, mackerel, and eggs. Some of these foods are rich in both.  Graphic 88590 Version 4.0    Consumer Information Use and Disclaimer   This information is not specific medical advice and does not replace information you receive from your health care provider. This is only a brief summary of general information. It does NOT include all information about conditions, illnesses, injuries, tests, procedures, treatments, therapies, discharge instructions or life-style choices that may apply to you. You must talk with your health care provider for complete information about your health and treatment options. This information should not be used to decide whether or not to accept your health care provider's advice, instructions or recommendations. Only your health care provider has the knowledge and training to provide advice that is right for you. The use of this information is governed by the [employer] End User License Agreement, available at https://www.Shopcaster.Intilery.com/en/solutions/Reach.ly/about/mackenzie.The use of [employer] content is governed by the [employer] Terms of Use. ©2021 UpToDate, Inc. All rights reserved.  Copyright   © 2021 UpToDate, Inc. and/or its affiliates. All rights reserved.    "

## 2023-01-17 ENCOUNTER — OFFICE VISIT (OUTPATIENT)
Dept: GASTROENTEROLOGY | Facility: CLINIC | Age: 43
End: 2023-01-17
Payer: COMMERCIAL

## 2023-01-17 VITALS
SYSTOLIC BLOOD PRESSURE: 132 MMHG | OXYGEN SATURATION: 97 % | DIASTOLIC BLOOD PRESSURE: 46 MMHG | WEIGHT: 293 LBS | HEART RATE: 70 BPM | BODY MASS INDEX: 45.99 KG/M2 | HEIGHT: 67 IN

## 2023-01-17 DIAGNOSIS — R19.7 DIARRHEA, UNSPECIFIED TYPE: ICD-10-CM

## 2023-01-17 DIAGNOSIS — K92.1 BLOOD IN STOOL: Primary | ICD-10-CM

## 2023-01-17 PROCEDURE — 3008F BODY MASS INDEX DOCD: CPT | Mod: ,,, | Performed by: NURSE PRACTITIONER

## 2023-01-17 PROCEDURE — 3078F DIAST BP <80 MM HG: CPT | Mod: ,,, | Performed by: NURSE PRACTITIONER

## 2023-01-17 PROCEDURE — 99213 OFFICE O/P EST LOW 20 MIN: CPT | Mod: PBBFAC | Performed by: NURSE PRACTITIONER

## 2023-01-17 PROCEDURE — 3078F PR MOST RECENT DIASTOLIC BLOOD PRESSURE < 80 MM HG: ICD-10-PCS | Mod: ,,, | Performed by: NURSE PRACTITIONER

## 2023-01-17 PROCEDURE — 99204 PR OFFICE/OUTPT VISIT, NEW, LEVL IV, 45-59 MIN: ICD-10-PCS | Mod: S$PBB,,, | Performed by: NURSE PRACTITIONER

## 2023-01-17 PROCEDURE — 1159F PR MEDICATION LIST DOCUMENTED IN MEDICAL RECORD: ICD-10-PCS | Mod: ,,, | Performed by: NURSE PRACTITIONER

## 2023-01-17 PROCEDURE — 99204 OFFICE O/P NEW MOD 45 MIN: CPT | Mod: S$PBB,,, | Performed by: NURSE PRACTITIONER

## 2023-01-17 PROCEDURE — 3075F SYST BP GE 130 - 139MM HG: CPT | Mod: ,,, | Performed by: NURSE PRACTITIONER

## 2023-01-17 PROCEDURE — 3075F PR MOST RECENT SYSTOLIC BLOOD PRESS GE 130-139MM HG: ICD-10-PCS | Mod: ,,, | Performed by: NURSE PRACTITIONER

## 2023-01-17 PROCEDURE — 3008F PR BODY MASS INDEX (BMI) DOCUMENTED: ICD-10-PCS | Mod: ,,, | Performed by: NURSE PRACTITIONER

## 2023-01-17 PROCEDURE — 1159F MED LIST DOCD IN RCRD: CPT | Mod: ,,, | Performed by: NURSE PRACTITIONER

## 2023-01-17 RX ORDER — NEBIVOLOL 20 MG/1
1 TABLET ORAL DAILY
COMMUNITY
Start: 2022-12-01 | End: 2024-01-05 | Stop reason: SDUPTHER

## 2023-01-17 NOTE — PROGRESS NOTES
Komal Busch is a 43 y.o. female here for Hemorrhoids, Irritable Bowel Syndrome, and Rectal Bleeding (Bright; x3-4 months off and on)        PCP: Sarah Dacosta  Referring Provider: No referring provider defined for this encounter.     HPI:  Presents for report of rectal bleeding. States that blood in stool has increased and that she is seeing it several times per week. Endorses urgent diarrhea that has also increased. States that she has always had diarrhea following cholecystectomy, but now this is worse. Her son who is also our patient has Ulcerative colitis. No previous colonoscopy. No weight loss.    Rectal Bleeding  Pertinent negatives include no abdominal pain, change in bowel habit, fatigue, fever, nausea or vomiting.       ROS:  Review of Systems   Constitutional:  Negative for appetite change, fatigue, fever and unexpected weight change.   HENT:  Negative for trouble swallowing.    Gastrointestinal:  Positive for blood in stool, diarrhea and hematochezia. Negative for abdominal pain, anal bleeding, change in bowel habit, constipation, nausea, vomiting, reflux and change in bowel habit.   Musculoskeletal:  Negative for gait problem.   Integumentary:  Negative for pallor.   Psychiatric/Behavioral:  The patient is not nervous/anxious.         PMHX:  has a past medical history of Encounter for screening for COVID-19 (9/29/2021), Exposure to COVID-19 virus (7/28/2021), GERD (gastroesophageal reflux disease), Hypertension, Nonintractable headache (8/16/2021), and Pleurisy (10/29/2021).    PSHX:  has a past surgical history that includes Hysterectomy; Cholecystectomy; and Knee surgery (Left).    PFHX: family history includes Asthma in her mother; COPD in her mother; Diabetes in her father and sister; Heart attack in her father; Heart attacks under age 50 in her brother; Heart disease in her father, maternal grandfather, and sister; Hypertension in her father; Kidney disease in her maternal  "grandmother; Miscarriages / Stillbirths in her maternal grandmother; Skin cancer in her maternal grandmother.    PSlHX:  reports that she has never smoked. She has never used smokeless tobacco. She reports that she does not drink alcohol and does not use drugs.        Review of patient's allergies indicates:   Allergen Reactions    Keflex [cephalexin]     Neosporin [neomycin-bacitracnzn-polymyxnb]     Pcn [penicillins]     Reglan [metoclopramide]        Medication List with Changes/Refills   Current Medications    ASPIRIN (ECOTRIN) 81 MG EC TABLET    Take 81 mg by mouth once daily.    ERGOCALCIFEROL (ERGOCALCIFEROL) 50,000 UNIT CAP    Take 1 capsule (50,000 Units total) by mouth every 30 days.    NEBIVOLOL (BYSTOLIC) 20 MG TAB    Take 1 tablet by mouth Daily.    NEOMYCIN-POLYMYXIN-DEXAMETHASONE (DEXACINE) 3.5 MG/G-10,000 UNIT/G-0.1 % OINT    Place 1 drop into both eyes daily as needed.    PANTOPRAZOLE (PROTONIX) 40 MG TABLET    Take 1 tablet by mouth once daily.   Discontinued Medications    BYSTOLIC 10 MG TAB    Take 20 mg by mouth once daily.        Objective Findings:  Vital Signs:  BP (!) 132/46   Pulse 70   Ht 5' 7" (1.702 m)   Wt (!) 146 kg (321 lb 12.8 oz)   SpO2 97%   BMI 50.40 kg/m²  Body mass index is 50.4 kg/m².    Physical Exam:  Physical Exam  Vitals and nursing note reviewed.   Constitutional:       General: She is not in acute distress.     Appearance: Normal appearance. She is obese.   HENT:      Mouth/Throat:      Mouth: Mucous membranes are moist.   Cardiovascular:      Rate and Rhythm: Normal rate.   Pulmonary:      Effort: Pulmonary effort is normal.      Breath sounds: No wheezing, rhonchi or rales.   Abdominal:      General: Bowel sounds are normal. There is no distension.      Palpations: Abdomen is soft. There is no mass.      Tenderness: There is no abdominal tenderness.   Skin:     General: Skin is warm and dry.      Coloration: Skin is not jaundiced or pale.   Neurological:      " Mental Status: She is alert and oriented to person, place, and time.   Psychiatric:         Mood and Affect: Mood normal.        Labs:  Lab Results   Component Value Date    WBC 8.23 09/29/2021    HGB 14.3 09/29/2021    HCT 42.9 09/29/2021    MCV 86.7 09/29/2021    RDW 13.4 09/29/2021     09/29/2021    LYMPH 22.6 (L) 09/29/2021    LYMPH 1.86 09/29/2021    MONO 7.8 (H) 09/29/2021    EOS 0.28 09/29/2021    BASO 0.05 09/29/2021     Lab Results   Component Value Date     09/29/2021    K 4.5 09/29/2021     (H) 09/29/2021    CO2 26 09/29/2021    GLU 98 09/29/2021    BUN 15 09/29/2021    CREATININE 0.84 09/29/2021    CALCIUM 9.6 09/29/2021    PROT 7.6 09/29/2021    ALBUMIN 3.8 09/29/2021    BILITOT 0.8 09/29/2021    ALKPHOS 93 09/29/2021    AST 23 09/29/2021    ALT 33 09/29/2021         Imaging: No results found.      Assessment:  Komal Busch is a 43 y.o. female here with:  1. Blood in stool    2. Diarrhea, unspecified type          Recommendations:  1. Stool studies, Celiac labs, CRP, CBC, CMP as ordered below  2. Schedule colonoscopy  3. Avoid NSAID's    No follow-ups on file.      Order summary:  Orders Placed This Encounter    Fecal leukocytes    Giardia antigen    Enteric Pathogen Panel    C Diff Toxin by PCR    C-Reactive Protein    IgA    Endomysial antibody, IgA titer    Tissue Transglutaminase Ab, IgA    Gliadin (Deamidated) Ab, Eval    CBC Auto Differential    Comprehensive Metabolic Panel    Fecal fat, qualitative    Calprotectin, Stool    Occult blood x 1, stool    Miscellaneous Test, Sendout pancreatic elastase    Colonoscopy       Thank you for allowing me to participate in the care of Komal Busch.      JOHAN Contreras

## 2023-01-25 LAB — OCCULT BLOOD: NEGATIVE

## 2023-01-25 PROCEDURE — 82272 OCCULT BLOOD X 1, STOOL: ICD-10-PCS | Mod: QW,,, | Performed by: CLINICAL MEDICAL LABORATORY

## 2023-01-25 PROCEDURE — 82272 OCCULT BLD FECES 1-3 TESTS: CPT | Mod: QW,,, | Performed by: CLINICAL MEDICAL LABORATORY

## 2023-02-02 ENCOUNTER — TELEPHONE (OUTPATIENT)
Dept: GASTROENTEROLOGY | Facility: CLINIC | Age: 43
End: 2023-02-02
Payer: COMMERCIAL

## 2023-03-09 ENCOUNTER — OFFICE VISIT (OUTPATIENT)
Dept: FAMILY MEDICINE | Facility: CLINIC | Age: 43
End: 2023-03-09
Payer: COMMERCIAL

## 2023-03-09 VITALS
DIASTOLIC BLOOD PRESSURE: 70 MMHG | HEART RATE: 80 BPM | BODY MASS INDEX: 45.99 KG/M2 | TEMPERATURE: 98 F | RESPIRATION RATE: 20 BRPM | SYSTOLIC BLOOD PRESSURE: 130 MMHG | HEIGHT: 67 IN | WEIGHT: 293 LBS | OXYGEN SATURATION: 96 %

## 2023-03-09 DIAGNOSIS — J01.00 ACUTE NON-RECURRENT MAXILLARY SINUSITIS: Primary | ICD-10-CM

## 2023-03-09 PROBLEM — K92.1 BLOOD IN STOOL: Status: RESOLVED | Noted: 2023-01-17 | Resolved: 2023-03-09

## 2023-03-09 PROBLEM — R19.7 DIARRHEA: Status: RESOLVED | Noted: 2021-10-29 | Resolved: 2023-03-09

## 2023-03-09 PROCEDURE — 99214 PR OFFICE/OUTPT VISIT, EST, LEVL IV, 30-39 MIN: ICD-10-PCS | Mod: 25,,, | Performed by: NURSE PRACTITIONER

## 2023-03-09 PROCEDURE — 3078F PR MOST RECENT DIASTOLIC BLOOD PRESSURE < 80 MM HG: ICD-10-PCS | Mod: ,,, | Performed by: NURSE PRACTITIONER

## 2023-03-09 PROCEDURE — 96372 THER/PROPH/DIAG INJ SC/IM: CPT | Mod: JZ,,, | Performed by: NURSE PRACTITIONER

## 2023-03-09 PROCEDURE — 1159F MED LIST DOCD IN RCRD: CPT | Mod: ,,, | Performed by: NURSE PRACTITIONER

## 2023-03-09 PROCEDURE — 3075F SYST BP GE 130 - 139MM HG: CPT | Mod: ,,, | Performed by: NURSE PRACTITIONER

## 2023-03-09 PROCEDURE — 1159F PR MEDICATION LIST DOCUMENTED IN MEDICAL RECORD: ICD-10-PCS | Mod: ,,, | Performed by: NURSE PRACTITIONER

## 2023-03-09 PROCEDURE — 3008F BODY MASS INDEX DOCD: CPT | Mod: ,,, | Performed by: NURSE PRACTITIONER

## 2023-03-09 PROCEDURE — 3078F DIAST BP <80 MM HG: CPT | Mod: ,,, | Performed by: NURSE PRACTITIONER

## 2023-03-09 PROCEDURE — 96372 PR INJECTION,THERAP/PROPH/DIAG2ST, IM OR SUBCUT: ICD-10-PCS | Mod: JZ,,, | Performed by: NURSE PRACTITIONER

## 2023-03-09 PROCEDURE — 3075F PR MOST RECENT SYSTOLIC BLOOD PRESS GE 130-139MM HG: ICD-10-PCS | Mod: ,,, | Performed by: NURSE PRACTITIONER

## 2023-03-09 PROCEDURE — 99214 OFFICE O/P EST MOD 30 MIN: CPT | Mod: 25,,, | Performed by: NURSE PRACTITIONER

## 2023-03-09 PROCEDURE — 3008F PR BODY MASS INDEX (BMI) DOCUMENTED: ICD-10-PCS | Mod: ,,, | Performed by: NURSE PRACTITIONER

## 2023-03-09 RX ORDER — LINCOMYCIN HYDROCHLORIDE 300 MG/ML
600 INJECTION, SOLUTION INTRAMUSCULAR; INTRAVENOUS; SUBCONJUNCTIVAL
Status: COMPLETED | OUTPATIENT
Start: 2023-03-09 | End: 2023-03-09

## 2023-03-09 RX ORDER — CHLORPHENIRAMINE MALEATE AND PHENYLEPHRINE HYDROCHLORIDE 4; 10 MG/1; MG/1
1 TABLET, COATED ORAL EVERY 6 HOURS PRN
Qty: 30 TABLET | Refills: 0 | Status: SHIPPED | OUTPATIENT
Start: 2023-03-09 | End: 2023-03-19

## 2023-03-09 RX ORDER — AZITHROMYCIN 250 MG/1
TABLET, FILM COATED ORAL
Qty: 6 TABLET | Refills: 0 | Status: SHIPPED | OUTPATIENT
Start: 2023-03-09 | End: 2023-03-14

## 2023-03-09 RX ORDER — DEXAMETHASONE SODIUM PHOSPHATE 4 MG/ML
4 INJECTION, SOLUTION INTRA-ARTICULAR; INTRALESIONAL; INTRAMUSCULAR; INTRAVENOUS; SOFT TISSUE
Status: COMPLETED | OUTPATIENT
Start: 2023-03-09 | End: 2023-03-09

## 2023-03-09 RX ADMIN — LINCOMYCIN HYDROCHLORIDE 600 MG: 300 INJECTION, SOLUTION INTRAMUSCULAR; INTRAVENOUS; SUBCONJUNCTIVAL at 04:03

## 2023-03-09 RX ADMIN — DEXAMETHASONE SODIUM PHOSPHATE 4 MG: 4 INJECTION, SOLUTION INTRA-ARTICULAR; INTRALESIONAL; INTRAMUSCULAR; INTRAVENOUS; SOFT TISSUE at 04:03

## 2023-03-09 NOTE — PROGRESS NOTES
Sarah Dacosta NP   [facility]  [address]  [place]     PATIENT NAME: Komal Busch  : 1980  DATE: 3/9/23  MRN: [id number]      Billing Provider: Sarah Dacosta NP  Level of Service:   Patient PCP Information       Provider PCP Type    Sarah Dacosta NP General            Reason for Visit / Chief Complaint: Nasal Congestion, Cough, and Sinus Problem (C/o symptoms x 1 wk.  States she took covid home test and it was negative.  Has been taking benadryle, and otc cough/congestion  syrup.)       Update PCP  Update Chief Complaint         History of Present Illness / Problem Focused Workflow     Komal Busch presents to the clinic c/o nasal congestion, cough, sinus problem, c/o symptoms x 1 week, states she took home covid test and it was negative, has been taking benadryl and otc cough, congestion syrup.      Review of Systems     Review of Systems   Constitutional:  Negative for chills, fatigue and fever.   HENT:  Positive for nasal congestion and sinus pressure/congestion. Negative for ear pain, facial swelling, hearing loss, mouth dryness, mouth sores, postnasal drip, rhinorrhea and goiter.    Eyes:  Negative for discharge and itching.   Respiratory:  Negative for cough, shortness of breath and wheezing.    Cardiovascular:  Negative for chest pain and leg swelling.   Gastrointestinal:  Negative for abdominal pain, change in bowel habit and change in bowel habit.   Genitourinary:  Negative for difficulty urinating, dysuria, enuresis, frequency, hematuria and urgency.   Neurological:  Negative for dizziness, vertigo, syncope, weakness and headaches.   Psychiatric/Behavioral:  Negative for decreased concentration.    All other systems reviewed and are negative.     Medical / Social / Family History     Past Medical History:   Diagnosis Date    Encounter for screening for COVID-19 2021    Exposure to COVID-19 virus 2021    GERD (gastroesophageal reflux disease)      "Hypertension     Nonintractable headache 8/16/2021    Pleurisy 10/29/2021       Past Surgical History:   Procedure Laterality Date    CHOLECYSTECTOMY      HYSTERECTOMY      KNEE SURGERY Left        Social History  Ms.  reports that she has never smoked. She has never used smokeless tobacco. She reports that she does not drink alcohol and does not use drugs.    Family History  Ms.'s family history includes Asthma in her mother; COPD in her mother; Diabetes in her father and sister; Heart attack in her father; Heart attacks under age 50 in her brother; Heart disease in her father, maternal grandfather, and sister; Hypertension in her father; Kidney disease in her maternal grandmother; Miscarriages / Stillbirths in her maternal grandmother; Skin cancer in her maternal grandmother.    Medications and Allergies     Medications  Outpatient Medications Marked as Taking for the 3/9/23 encounter (Office Visit) with Sarah Dacosta NP   Medication Sig Dispense Refill    aspirin (ECOTRIN) 81 MG EC tablet Take 81 mg by mouth once daily.      ergocalciferol (ERGOCALCIFEROL) 50,000 unit Cap Take 1 capsule (50,000 Units total) by mouth every 30 days. 3 capsule 1    nebivoloL (BYSTOLIC) 20 mg Tab Take 1 tablet by mouth Daily.      pantoprazole (PROTONIX) 40 MG tablet Take 1 tablet by mouth once daily.         Allergies  Review of patient's allergies indicates:   Allergen Reactions    Keflex [cephalexin]     Neosporin [neomycin-bacitracnzn-polymyxnb]     Pcn [penicillins]     Reglan [metoclopramide]        Physical Examination     Vitals:    03/09/23 1425   BP: 130/70   BP Location: Left arm   Patient Position: Sitting   BP Method: Large (Manual)   Pulse: 80   Resp: 20   Temp: 97.7 °F (36.5 °C)   TempSrc: Oral   SpO2: 96%   Weight: (!) 143.8 kg (317 lb)   Height: 5' 7.01" (1.702 m)      Physical Exam  Vitals and nursing note reviewed.   Constitutional:       Appearance: Normal appearance.   HENT:      Head: Normocephalic.      Right " Ear: Tympanic membrane, ear canal and external ear normal.      Left Ear: Tympanic membrane, ear canal and external ear normal.      Nose: Congestion present.      Comments: Mod amt thick yellow nasal secretion, pressure over max region     Mouth/Throat:      Mouth: Mucous membranes are moist.   Eyes:      Extraocular Movements: Extraocular movements intact.      Conjunctiva/sclera: Conjunctivae normal.      Pupils: Pupils are equal, round, and reactive to light.   Neck:      Comments: Jarett ant cervical nodes  Cardiovascular:      Rate and Rhythm: Normal rate and regular rhythm.      Pulses: Normal pulses.      Heart sounds: Normal heart sounds.   Pulmonary:      Effort: Pulmonary effort is normal.      Breath sounds: Normal breath sounds.   Musculoskeletal:         General: Normal range of motion.      Cervical back: Normal range of motion.   Lymphadenopathy:      Cervical: Cervical adenopathy present.   Skin:     General: Skin is warm and dry.   Neurological:      General: No focal deficit present.      Mental Status: She is alert and oriented to person, place, and time.   Psychiatric:         Behavior: Behavior normal.        Assessment and Plan (including Health Maintenance)      Problem List  Smart Sets  Document Outside HM   :    Plan:     There are no diagnoses linked to this encounter.        Health Maintenance Due   Topic Date Due    Hepatitis C Screening  Never done    HIV Screening  Never done    TETANUS VACCINE  Never done    Colorectal Cancer Screening  Never done       Problem List Items Addressed This Visit    None        Health Maintenance Topics with due status: Not Due       Topic Last Completion Date    Mammogram 10/10/2022    Hemoglobin A1c (Diabetic Prevention Screening) 12/29/2022    Lipid Panel 12/29/2022       Procedures     Future Appointments   Date Time Provider Department Center   3/9/2023  4:15 PM Sarah Dacosta NP [facility]   3/24/2023  7:30 AM [facility] ROOM [address] [place] [address]  [facility]   3/29/2023  8:30 AM Tammy Penn FNP [facility]   6/27/2023 10:00 AM Sarah Dacosta NP [facility]   10/16/2023  8:30 AM [facility]   12/26/2023 10:30 AM Sarah Dacosta NP [facility]        No follow-ups on file.       Signature:  Sarah Dacosta NP    Date of encounter: 3/9/23

## 2023-03-09 NOTE — ASSESSMENT & PLAN NOTE
Avoid irritants,such as strong perfumes, pian, cig smoke  Meds as ordered,   Increase fuids,   Return to clinic as needed and as scheudled

## 2023-03-10 ENCOUNTER — PATIENT OUTREACH (OUTPATIENT)
Dept: ADMINISTRATIVE | Facility: HOSPITAL | Age: 43
End: 2023-03-10

## 2023-03-10 NOTE — PROGRESS NOTES
Population Health review of encounter with date of service 03/09/2023 with PENNY Dacosta.  Does not qualify for HY or CM.  Has CMH scheduled for 06/27/2023 and HY scheduled for 02/26/2023.

## 2023-06-12 PROBLEM — J01.00 ACUTE MAXILLARY SINUSITIS: Status: RESOLVED | Noted: 2021-07-28 | Resolved: 2023-06-12

## 2023-06-26 ENCOUNTER — ANESTHESIA (OUTPATIENT)
Dept: GASTROENTEROLOGY | Facility: HOSPITAL | Age: 43
End: 2023-06-26
Payer: COMMERCIAL

## 2023-06-26 ENCOUNTER — HOSPITAL ENCOUNTER (OUTPATIENT)
Dept: GASTROENTEROLOGY | Facility: HOSPITAL | Age: 43
Discharge: HOME | End: 2023-06-26
Attending: NURSE PRACTITIONER
Payer: COMMERCIAL

## 2023-06-26 ENCOUNTER — ANESTHESIA EVENT (OUTPATIENT)
Dept: GASTROENTEROLOGY | Facility: HOSPITAL | Age: 43
End: 2023-06-26
Payer: COMMERCIAL

## 2023-06-26 VITALS
OXYGEN SATURATION: 97 % | RESPIRATION RATE: 17 BRPM | HEART RATE: 51 BPM | TEMPERATURE: 99 F | BODY MASS INDEX: 48.23 KG/M2 | WEIGHT: 293 LBS | DIASTOLIC BLOOD PRESSURE: 69 MMHG | SYSTOLIC BLOOD PRESSURE: 124 MMHG

## 2023-06-26 DIAGNOSIS — K63.5 POLYP OF ASCENDING COLON, UNSPECIFIED TYPE: ICD-10-CM

## 2023-06-26 DIAGNOSIS — R19.7 DIARRHEA, UNSPECIFIED TYPE: ICD-10-CM

## 2023-06-26 DIAGNOSIS — K92.1 BLOOD IN STOOL: ICD-10-CM

## 2023-06-26 DIAGNOSIS — D12.4 ADENOMATOUS POLYP OF DESCENDING COLON: ICD-10-CM

## 2023-06-26 DIAGNOSIS — K57.30 DIVERTICULOSIS OF COLON: Primary | ICD-10-CM

## 2023-06-26 PROCEDURE — 88342 IMHCHEM/IMCYTCHM 1ST ANTB: CPT | Mod: 26,,, | Performed by: PATHOLOGY

## 2023-06-26 PROCEDURE — 88342 SURGICAL PATHOLOGY: ICD-10-PCS | Mod: 26,,, | Performed by: PATHOLOGY

## 2023-06-26 PROCEDURE — D9220A PRA ANESTHESIA: ICD-10-PCS | Mod: ,,, | Performed by: NURSE ANESTHETIST, CERTIFIED REGISTERED

## 2023-06-26 PROCEDURE — 45385 COLONOSCOPY W/LESION REMOVAL: CPT | Mod: ,,, | Performed by: INTERNAL MEDICINE

## 2023-06-26 PROCEDURE — 45385 COLONOSCOPY W/LESION REMOVAL: CPT | Performed by: INTERNAL MEDICINE

## 2023-06-26 PROCEDURE — 37000008 HC ANESTHESIA 1ST 15 MINUTES

## 2023-06-26 PROCEDURE — 88305 SURGICAL PATHOLOGY: ICD-10-PCS | Mod: 26,,, | Performed by: PATHOLOGY

## 2023-06-26 PROCEDURE — 63600175 PHARM REV CODE 636 W HCPCS: Performed by: NURSE ANESTHETIST, CERTIFIED REGISTERED

## 2023-06-26 PROCEDURE — 37000009 HC ANESTHESIA EA ADD 15 MINS

## 2023-06-26 PROCEDURE — 45380 PR COLONOSCOPY,BIOPSY: ICD-10-PCS | Mod: 59,,, | Performed by: INTERNAL MEDICINE

## 2023-06-26 PROCEDURE — 88305 TISSUE EXAM BY PATHOLOGIST: CPT | Mod: TC,SUR | Performed by: INTERNAL MEDICINE

## 2023-06-26 PROCEDURE — 88305 TISSUE EXAM BY PATHOLOGIST: CPT | Mod: 26,,, | Performed by: PATHOLOGY

## 2023-06-26 PROCEDURE — 25000003 PHARM REV CODE 250: Performed by: NURSE ANESTHETIST, CERTIFIED REGISTERED

## 2023-06-26 PROCEDURE — 45380 COLONOSCOPY AND BIOPSY: CPT | Mod: 59 | Performed by: INTERNAL MEDICINE

## 2023-06-26 PROCEDURE — D9220A PRA ANESTHESIA: Mod: ,,, | Performed by: NURSE ANESTHETIST, CERTIFIED REGISTERED

## 2023-06-26 PROCEDURE — 45385 PR COLONOSCOPY,REMV LESN,SNARE: ICD-10-PCS | Mod: ,,, | Performed by: INTERNAL MEDICINE

## 2023-06-26 PROCEDURE — 45380 COLONOSCOPY AND BIOPSY: CPT | Mod: 59,,, | Performed by: INTERNAL MEDICINE

## 2023-06-26 PROCEDURE — 27201423 OPTIME MED/SURG SUP & DEVICES STERILE SUPPLY

## 2023-06-26 RX ORDER — LIDOCAINE HYDROCHLORIDE 20 MG/ML
INJECTION INTRAVENOUS
Status: DISCONTINUED | OUTPATIENT
Start: 2023-06-26 | End: 2023-06-26

## 2023-06-26 RX ORDER — GLYCOPYRROLATE 0.2 MG/ML
INJECTION INTRAMUSCULAR; INTRAVENOUS
Status: DISCONTINUED | OUTPATIENT
Start: 2023-06-26 | End: 2023-06-26

## 2023-06-26 RX ORDER — SODIUM CHLORIDE 0.9 % (FLUSH) 0.9 %
10 SYRINGE (ML) INJECTION
Status: CANCELLED | OUTPATIENT
Start: 2023-06-26

## 2023-06-26 RX ORDER — PROPOFOL 10 MG/ML
VIAL (ML) INTRAVENOUS
Status: DISCONTINUED | OUTPATIENT
Start: 2023-06-26 | End: 2023-06-26

## 2023-06-26 RX ADMIN — LIDOCAINE HYDROCHLORIDE 100 MG: 20 INJECTION, SOLUTION INTRAVENOUS at 10:06

## 2023-06-26 RX ADMIN — GLYCOPYRROLATE 0.2 MG: 0.2 INJECTION INTRAMUSCULAR; INTRAVENOUS at 10:06

## 2023-06-26 RX ADMIN — PROPOFOL 50 MG: 10 INJECTION, EMULSION INTRAVENOUS at 10:06

## 2023-06-26 RX ADMIN — SODIUM CHLORIDE: 9 INJECTION, SOLUTION INTRAVENOUS at 10:06

## 2023-06-26 RX ADMIN — PROPOFOL 100 MG: 10 INJECTION, EMULSION INTRAVENOUS at 10:06

## 2023-06-26 NOTE — TRANSFER OF CARE
Anesthesia Transfer of Care Note    Patient: Komal Busch    Procedure(s) Performed: * No procedures listed *    Patient location: GI    Anesthesia Type: general    Transport from OR: Transported from OR on room air with adequate spontaneous ventilation    Post pain: adequate analgesia    Post assessment: no apparent anesthetic complications    Post vital signs: stable    Level of consciousness: responds to stimulation    Nausea/Vomiting: no nausea/vomiting    Complications: none    Transfer of care protocol was followed      Last vitals:   Visit Vitals  /62 (BP Location: Right arm, Patient Position: Lying)   Pulse 76   Temp 37 °C (98.6 °F) (Oral)   Resp 18   Wt (!) 139.7 kg (308 lb)   SpO2 98%   Breastfeeding No   BMI 48.23 kg/m²

## 2023-06-26 NOTE — ANESTHESIA PREPROCEDURE EVALUATION
06/26/2023  Komal Busch is a 43 y.o., female.      Pre-op Assessment    I have reviewed the Patient Summary Reports.     I have reviewed the Nursing Notes. I have reviewed the NPO Status.   I have reviewed the Medications.     Review of Systems  Cardiovascular:   Hypertension    Hepatic/GI:   GERD    Neurological:   Headaches        Physical Exam  General: Well nourished, Cooperative, Alert and Oriented    Airway:  Mallampati: II   Mouth Opening: Normal  TM Distance: Normal  Tongue: Normal  Neck ROM: Normal ROM        Anesthesia Plan  Type of Anesthesia, risks & benefits discussed:    Anesthesia Type: Gen Natural Airway  Intra-op Monitoring Plan: Standard ASA Monitors  Post Op Pain Control Plan: multimodal analgesia  Induction:  IV  Airway Plan: , Awake  Informed Consent: Informed consent signed with the Patient and all parties understand the risks and agree with anesthesia plan.  All questions answered. Patient consented to blood products? Yes  ASA Score: 3  Day of Surgery Review of History & Physical: H&P Update referred to the surgeon/provider.I have interviewed and examined the patient. I have reviewed the patient's H&P dated: There are no significant changes.     Ready For Surgery From Anesthesia Perspective.     .

## 2023-06-26 NOTE — ANESTHESIA RELEASE NOTE
Anesthesia Release from PACU Note    Patient: Komal Busch    Procedure(s) Performed: * No procedures listed *    Anesthesia type: general    Post pain: Adequate analgesia    Post assessment: no apparent anesthetic complications    Last Vitals:   Visit Vitals  /62 (BP Location: Right arm, Patient Position: Lying)   Pulse 76   Temp 37 °C (98.6 °F) (Oral)   Resp 18   Wt (!) 139.7 kg (308 lb)   SpO2 98%   Breastfeeding No   BMI 48.23 kg/m²       Post vital signs: stable    Level of consciousness: awake    Nausea/Vomiting: no nausea/no vomiting    Complications: none    Airway Patency: patent    Respiratory: unassisted    Cardiovascular: stable and blood pressure at baseline    Hydration: euvolemic

## 2023-06-26 NOTE — ANESTHESIA POSTPROCEDURE EVALUATION
Anesthesia Post Evaluation    Patient: Komal Busch    Procedure(s) Performed: * No procedures listed *    Final Anesthesia Type: general      Patient location during evaluation: GI PACU  Patient participation: Yes- Able to Participate  Level of consciousness: responds to stimulation  Post-procedure vital signs: reviewed and stable  Pain management: adequate  Airway patency: patent  MCKAY mitigation strategies: Multimodal analgesia  PONV status at discharge: No PONV  Anesthetic complications: no      Cardiovascular status: blood pressure returned to baseline  Respiratory status: unassisted and spontaneous ventilation  Hydration status: euvolemic  Follow-up not needed.          Vitals Value Taken Time   /62 06/26/23 1038   Temp 37 °C (98.6 °F) 06/26/23 1038   Pulse 76 06/26/23 1038   Resp 18 06/26/23 1038   SpO2 98 % 06/26/23 1038         No case tracking events are documented in the log.      Pain/Hamida Score: No data recorded

## 2023-06-26 NOTE — H&P
[facility]  Gastroenterology  H&P    Patient Name: Komal Busch  MRN: [id number]  Admission Date: 6/26/2023  Code Status: No Order    Attending Provider: HAMZAH Cast   Primary Care Physician: Sarah Dacosta NP  Principal Problem:<principal problem not specified>    Subjective:     History of Present Illness: Pt has diarrhea and blood per rectum. Her last colonoscopy was many years ago, > 10 years ago, with no report available. She has family hx of UC in her son.    Past Medical History:   Diagnosis Date    Encounter for screening for COVID-19 9/29/2021    Exposure to COVID-19 virus 7/28/2021    GERD (gastroesophageal reflux disease)     Hypertension     Nonintractable headache 8/16/2021    Pleurisy 10/29/2021       Past Surgical History:   Procedure Laterality Date    CHOLECYSTECTOMY      HYSTERECTOMY      KNEE SURGERY Left        Review of patient's allergies indicates:   Allergen Reactions    Keflex [cephalexin]     Neosporin [neomycin-bacitracnzn-polymyxnb]     Pcn [penicillins]     Reglan [metoclopramide]      Family History       Problem Relation (Age of Onset)    Asthma Mother    COPD Mother    Diabetes Father, Sister    Heart attack Father    Heart attacks under age 50 Brother    Heart disease Father, Sister, Maternal Grandfather    Hypertension Father    Kidney disease Maternal Grandmother    Miscarriages / Stillbirths Maternal Grandmother    Skin cancer Maternal Grandmother          Tobacco Use    Smoking status: Never    Smokeless tobacco: Never   Substance and Sexual Activity    Alcohol use: Never    Drug use: Never    Sexual activity: Yes     Review of Systems   Respiratory: Negative.     Cardiovascular: Negative.    Gastrointestinal:  Positive for blood in stool and diarrhea.   Objective:     Vital Signs (Most Recent):  Pulse: 69 (06/26/23 0949)  Resp: 17 (06/26/23 0949)  BP: 120/67 (06/26/23 0949)  SpO2: 96 % (06/26/23 0949) Vital Signs (24h Range):  Pulse:  [69] 69  Resp:   [17] 17  SpO2:  [96 %] 96 %  BP: (120)/(67) 120/67     Weight: (!) 139.7 kg (308 lb) (06/26/23 0949)  Body mass index is 48.23 kg/m².    No intake or output data in the 24 hours ending 06/26/23 1022    Lines/Drains/Airways       Peripheral Intravenous Line  Duration                  Peripheral IV - Single Lumen 06/26/23 0938 22 G Posterior;Right Forearm <1 day                    Physical Exam  Vitals reviewed.   Constitutional:       General: She is not in acute distress.     Appearance: Normal appearance. She is well-developed. She is not ill-appearing.   HENT:      Head: Normocephalic and atraumatic.      Nose: Nose normal.   Eyes:      Pupils: Pupils are equal, round, and reactive to light.   Cardiovascular:      Rate and Rhythm: Normal rate and regular rhythm.   Pulmonary:      Effort: Pulmonary effort is normal.      Breath sounds: Normal breath sounds. No wheezing.   Abdominal:      General: Abdomen is flat. Bowel sounds are normal. There is no distension.      Palpations: Abdomen is soft.      Tenderness: There is no abdominal tenderness. There is no guarding.   Skin:     General: Skin is warm and dry.      Coloration: Skin is not jaundiced.   Neurological:      Mental Status: She is alert.   Psychiatric:         Attention and Perception: Attention normal.         Mood and Affect: Affect normal.         Speech: Speech normal.         Behavior: Behavior is cooperative.      Comments: Pt was calm while speaking.       Significant Labs:  CBC: No results for input(s): WBC, HGB, HCT, PLT in the last 48 hours.  CMP: No results for input(s): GLU, CALCIUM, ALBUMIN, PROT, NA, K, CO2, CL, BUN, CREATININE, ALKPHOS, ALT, AST, BILITOT in the last 48 hours.    Significant Imaging:  Imaging results within the past 24 hours have been reviewed.    Assessment/Plan:     There are no hospital problems to display for this patient.        Imp: diarrhea, blood per rectum; family history of UC.  Plan: colonoscopy    Junior ALVARADO  MD Gt  Gastroenterology  [facility]

## 2023-06-28 LAB
DHEA SERPL-MCNC: NORMAL
ESTROGEN SERPL-MCNC: NORMAL PG/ML
INSULIN SERPL-ACNC: NORMAL U[IU]/ML
LAB AP GROSS DESCRIPTION: NORMAL
LAB AP LABORATORY NOTES: NORMAL
T3RU NFR SERPL: NORMAL %

## 2023-06-28 NOTE — PROGRESS NOTES
Two colon adenoma polyps were removed and the other biopsies for diarrhea were normal.  Rec: repeat colonoscopy in 5 years. Return to GI clinic if diarrhea persists.

## 2023-07-10 ENCOUNTER — OFFICE VISIT (OUTPATIENT)
Dept: GASTROENTEROLOGY | Facility: CLINIC | Age: 43
End: 2023-07-10
Payer: COMMERCIAL

## 2023-07-10 VITALS
SYSTOLIC BLOOD PRESSURE: 115 MMHG | DIASTOLIC BLOOD PRESSURE: 51 MMHG | WEIGHT: 293 LBS | HEIGHT: 67 IN | BODY MASS INDEX: 45.99 KG/M2

## 2023-07-10 DIAGNOSIS — R19.7 DIARRHEA, UNSPECIFIED TYPE: Primary | ICD-10-CM

## 2023-07-10 PROCEDURE — 1159F PR MEDICATION LIST DOCUMENTED IN MEDICAL RECORD: ICD-10-PCS | Mod: ,,, | Performed by: NURSE PRACTITIONER

## 2023-07-10 PROCEDURE — 3008F BODY MASS INDEX DOCD: CPT | Mod: ,,, | Performed by: NURSE PRACTITIONER

## 2023-07-10 PROCEDURE — 1159F MED LIST DOCD IN RCRD: CPT | Mod: ,,, | Performed by: NURSE PRACTITIONER

## 2023-07-10 PROCEDURE — 3074F SYST BP LT 130 MM HG: CPT | Mod: ,,, | Performed by: NURSE PRACTITIONER

## 2023-07-10 PROCEDURE — 3074F PR MOST RECENT SYSTOLIC BLOOD PRESSURE < 130 MM HG: ICD-10-PCS | Mod: ,,, | Performed by: NURSE PRACTITIONER

## 2023-07-10 PROCEDURE — 99214 PR OFFICE/OUTPT VISIT, EST, LEVL IV, 30-39 MIN: ICD-10-PCS | Mod: S$PBB,,, | Performed by: NURSE PRACTITIONER

## 2023-07-10 PROCEDURE — 99213 OFFICE O/P EST LOW 20 MIN: CPT | Mod: PBBFAC | Performed by: NURSE PRACTITIONER

## 2023-07-10 PROCEDURE — 3078F DIAST BP <80 MM HG: CPT | Mod: ,,, | Performed by: NURSE PRACTITIONER

## 2023-07-10 PROCEDURE — 3078F PR MOST RECENT DIASTOLIC BLOOD PRESSURE < 80 MM HG: ICD-10-PCS | Mod: ,,, | Performed by: NURSE PRACTITIONER

## 2023-07-10 PROCEDURE — 1160F RVW MEDS BY RX/DR IN RCRD: CPT | Mod: ,,, | Performed by: NURSE PRACTITIONER

## 2023-07-10 PROCEDURE — 99214 OFFICE O/P EST MOD 30 MIN: CPT | Mod: S$PBB,,, | Performed by: NURSE PRACTITIONER

## 2023-07-10 PROCEDURE — 1160F PR REVIEW ALL MEDS BY PRESCRIBER/CLIN PHARMACIST DOCUMENTED: ICD-10-PCS | Mod: ,,, | Performed by: NURSE PRACTITIONER

## 2023-07-10 PROCEDURE — 3008F PR BODY MASS INDEX (BMI) DOCUMENTED: ICD-10-PCS | Mod: ,,, | Performed by: NURSE PRACTITIONER

## 2023-07-10 NOTE — PROGRESS NOTES
Komal Busch is a 43 y.o. female here for No chief complaint on file.        PCP: Sarah Dacosta  Referring Provider: No referring provider defined for this encounter.     HPI:  Presents for follow-up after colonoscopy.  Colonoscopy on 06/26 23, reviewed, tubular adenoma x2, colon biopsies for diarrhea were negative for any colitis.  Calprotectin normal, pancreatic elastase was normal, celiac labs normal.  CRP was minimally elevated at 1.31.  Liver enzymes are normal.  Continues to report diarrhea.  She has had a previous cholecystectomy.  We did discuss increasing fiber supplementation such as Metamucil to reduce urgency of the diarrhea. Offered the patient Colestid due to diarrhea and previous cholecystectomy.  Patient declines at this time.  Reports that she would rather have loose stool then have constipation.        ROS:  Review of Systems   Constitutional:  Negative for appetite change, fatigue, fever and unexpected weight change.   HENT:  Negative for trouble swallowing.    Respiratory:  Negative for shortness of breath.    Cardiovascular:  Negative for chest pain.   Gastrointestinal:  Positive for diarrhea. Negative for abdominal pain, blood in stool, change in bowel habit, constipation, nausea, vomiting, reflux and change in bowel habit.   Musculoskeletal:  Negative for gait problem.   Integumentary:  Negative for pallor.   Psychiatric/Behavioral:  The patient is not nervous/anxious.         PMHX:  has a past medical history of Encounter for screening for COVID-19 (9/29/2021), Exposure to COVID-19 virus (7/28/2021), GERD (gastroesophageal reflux disease), Hypertension, Nonintractable headache (8/16/2021), and Pleurisy (10/29/2021).    PSHX:  has a past surgical history that includes Hysterectomy; Cholecystectomy; and Knee surgery (Left).    PFHX: family history includes Asthma in her mother; COPD in her mother; Diabetes in her father and sister; Heart attack in her father; Heart attacks under  "age 50 in her brother; Heart disease in her father, maternal grandfather, and sister; Hypertension in her father; Kidney disease in her maternal grandmother; Miscarriages / Stillbirths in her maternal grandmother; Skin cancer in her maternal grandmother.    PSlHX:  reports that she has never smoked. She has never used smokeless tobacco. She reports that she does not drink alcohol and does not use drugs.        Review of patient's allergies indicates:   Allergen Reactions    Keflex [cephalexin]     Neosporin [neomycin-bacitracnzn-polymyxnb]     Pcn [penicillins]     Reglan [metoclopramide]        Medication List with Changes/Refills   Current Medications    ASPIRIN (ECOTRIN) 81 MG EC TABLET    Take 81 mg by mouth once daily.    ERGOCALCIFEROL (ERGOCALCIFEROL) 50,000 UNIT CAP    Take 1 capsule (50,000 Units total) by mouth every 30 days.    NEBIVOLOL (BYSTOLIC) 20 MG TAB    Take 1 tablet by mouth Daily.    NEOMYCIN-POLYMYXIN-DEXAMETHASONE (DEXACINE) 3.5 MG/G-10,000 UNIT/G-0.1 % OINT    Place 1 drop into both eyes daily as needed.    PANTOPRAZOLE (PROTONIX) 40 MG TABLET    Take 1 tablet by mouth once daily.        Objective Findings:  Vital Signs:  BP (!) 115/51   Ht 5' 7" (1.702 m)   Wt (!) 143.1 kg (315 lb 6.4 oz)   BMI 49.40 kg/m²  Body mass index is 49.4 kg/m².    Physical Exam:  Physical Exam  Vitals and nursing note reviewed.   Constitutional:       General: She is not in acute distress.     Appearance: Normal appearance. She is obese.   HENT:      Mouth/Throat:      Mouth: Mucous membranes are moist.   Cardiovascular:      Rate and Rhythm: Normal rate.   Pulmonary:      Effort: Pulmonary effort is normal.      Breath sounds: No wheezing, rhonchi or rales.   Abdominal:      General: Bowel sounds are normal. There is no distension.      Palpations: Abdomen is soft. There is no mass.      Tenderness: There is no abdominal tenderness. There is no guarding.   Skin:     General: Skin is warm and dry.      " Coloration: Skin is not jaundiced or pale.   Neurological:      Mental Status: She is alert and oriented to person, place, and time.   Psychiatric:         Mood and Affect: Mood normal.        Labs:  Lab Results   Component Value Date    WBC 8.19 01/17/2023    HGB 14.5 01/17/2023    HCT 44.1 01/17/2023    MCV 86.5 01/17/2023    RDW 13.2 01/17/2023     01/17/2023    LYMPH 27.0 01/17/2023    LYMPH 2.21 01/17/2023    MONO 7.4 (H) 01/17/2023    EOS 0.35 01/17/2023    BASO 0.04 01/17/2023     Lab Results   Component Value Date     01/17/2023    K 4.0 01/17/2023     (H) 01/17/2023    CO2 28 01/17/2023     (H) 01/17/2023    BUN 18 01/17/2023    CREATININE 0.90 01/17/2023    CALCIUM 9.2 01/17/2023    PROT 7.6 01/17/2023    ALBUMIN 3.6 01/17/2023    BILITOT 0.7 01/17/2023    ALKPHOS 98 01/17/2023    AST 17 01/17/2023    ALT 26 01/17/2023         Imaging: Colonoscopy    Result Date: 6/26/2023  Table formatting from the original result was not included. Procedure Date 6/26/23 Impression Overall      Scattered pan-diverticulosis was present. No gross colitis was seen. A polyp was removed from the descending colon with hot snare polypectomy and a polyp was removed from the IC valve with biopsy. Biopsies were obtained for diarrhea evaluation. Recommendation  Await pathology results  Repeat colonoscopy in 5 years High fiber diet Discharge: Disp: DC to home. Resume diet. No driving x 24 hours. F/U with PCP as scheduled. Dx: diarrhea, diverticulosis, two polyps were removed on this exam. Indication Blood in stool, Diarrhea, unspecified type Providers Nia Marcus Technician MELANIE Jurado CRNA, RN Registered Nurse Junior Del Real MD Proceduralist Medications Moderate sedation administered by anesthesia staff - See anesthesia record. Preprocedure A history and physical has been performed, and patient medication allergies have been reviewed. The patient's tolerance of  previous anesthesia has been reviewed. The risks and benefits of the procedure and the sedation options and risks were discussed with the patient. All questions were answered and informed consent obtained. ASA Score: ASA 2 - Patient with mild systemic disease with no functional limitations Mallampati Airway Score: II (hard and soft palate, upper portion of tonsils anduvula visible) Details of the Procedure The patient underwent monitored anesthesia care, which was administered by an anesthesia professional. The patient's heart rate, blood pressure, level of consciousness, respirations, oxygen, ECG and ETCO2 were monitored throughout the procedure. A digital rectal exam was performed. A perianal exam was performed. The scope was introduced through the anus and advanced to the cecum. Retroflexion was performed in the rectum. The quality of bowel preparation was evaluated using the Cicero Bowel Preparation Scale with scores of: right colon = 2, transverse colon = 2, left colon = 2. The total BBPS score was 6. Bowel prep was adequate. The patient's estimated blood loss was minimal (<5 mL). The procedure was not difficult. The patient tolerated the procedure well. There were no apparent adverse events. Scope: Colonoscope Scope Serial: [id number] Events Procedure Events Event Event Time Procedure Events Event Event Time ENDO SCOPE IN TIME 6/26/2023 10:26 AM ENDO CECUM REACHED 6/26/2023 10:31 AM ENDO SCOPE OUT TIME 6/26/2023 10:38 AM CECAL WITHDRAWAL TIME: 6m 55s Findings Polyp in the ileocecal valve Performed multiple forceps biopsies in the ascending colon and transverse colon One polyp in the descending colon; removed by hot snare Diverticula in the ascending colon, descending colon and sigmoid colon; no bleeding was identified         Assessment:  Komal Busch is a 43 y.o. female here with:  1. Diarrhea, unspecified type          Recommendations:  1. Increase fiber supplementation.  May use Metamucil  2. Consider  Colestid if diarrhea continues  3. We will need repeat colonoscopy in 5 years    Follow up in about 1 year (around 7/10/2024).      Order summary:       Thank you for allowing me to participate in the care of Komal Busch.      JOHAN Contreras

## 2023-07-19 NOTE — ADDENDUM NOTE
Addendum  created 07/19/23 0812 by Braulio Correa, CRNA    Attestation recorded in Intraprocedure, Intraprocedure Attestations deleted, Intraprocedure Attestations filed

## 2023-10-16 ENCOUNTER — HOSPITAL ENCOUNTER (OUTPATIENT)
Dept: RADIOLOGY | Facility: HOSPITAL | Age: 43
Discharge: HOME | End: 2023-10-16
Attending: NURSE PRACTITIONER
Payer: COMMERCIAL

## 2023-10-16 VITALS — BODY MASS INDEX: 45.99 KG/M2 | HEIGHT: 67 IN | WEIGHT: 293 LBS

## 2023-10-16 DIAGNOSIS — Z12.31 BREAST CANCER SCREENING BY MAMMOGRAM: ICD-10-CM

## 2023-10-16 PROCEDURE — 77067 SCR MAMMO BI INCL CAD: CPT | Mod: TC

## 2024-01-05 ENCOUNTER — OFFICE VISIT (OUTPATIENT)
Dept: FAMILY MEDICINE | Facility: CLINIC | Age: 44
End: 2024-01-05
Payer: COMMERCIAL

## 2024-01-05 VITALS
SYSTOLIC BLOOD PRESSURE: 125 MMHG | WEIGHT: 293 LBS | HEIGHT: 67 IN | DIASTOLIC BLOOD PRESSURE: 82 MMHG | BODY MASS INDEX: 45.99 KG/M2 | HEART RATE: 79 BPM | RESPIRATION RATE: 18 BRPM | OXYGEN SATURATION: 95 % | TEMPERATURE: 99 F

## 2024-01-05 DIAGNOSIS — I10 HYPERTENSION, UNSPECIFIED TYPE: ICD-10-CM

## 2024-01-05 DIAGNOSIS — K21.9 GASTROESOPHAGEAL REFLUX DISEASE, UNSPECIFIED WHETHER ESOPHAGITIS PRESENT: ICD-10-CM

## 2024-01-05 DIAGNOSIS — Z00.01 ENCOUNTER FOR GENERAL ADULT MEDICAL EXAMINATION WITH ABNORMAL FINDINGS: Primary | ICD-10-CM

## 2024-01-05 DIAGNOSIS — Z13.1 SCREENING FOR DIABETES MELLITUS: ICD-10-CM

## 2024-01-05 DIAGNOSIS — Z13.220 SCREENING FOR LIPOID DISORDERS: ICD-10-CM

## 2024-01-05 LAB
ALBUMIN SERPL BCP-MCNC: 3.5 G/DL (ref 3.5–5)
ALBUMIN/GLOB SERPL: 0.9 {RATIO}
ALP SERPL-CCNC: 104 U/L (ref 39–100)
ALT SERPL W P-5'-P-CCNC: 27 U/L (ref 13–56)
ANION GAP SERPL CALCULATED.3IONS-SCNC: 12 MMOL/L (ref 7–16)
AST SERPL W P-5'-P-CCNC: 15 U/L (ref 15–37)
BASOPHILS # BLD AUTO: 0.06 K/UL (ref 0–0.2)
BASOPHILS NFR BLD AUTO: 0.7 % (ref 0–1)
BILIRUB SERPL-MCNC: 0.6 MG/DL (ref ?–1.2)
BUN SERPL-MCNC: 19 MG/DL (ref 7–18)
BUN/CREAT SERPL: 25 (ref 6–20)
CALCIUM SERPL-MCNC: 9.5 MG/DL (ref 8.5–10.1)
CHLORIDE SERPL-SCNC: 106 MMOL/L (ref 98–107)
CHOLEST SERPL-MCNC: 182 MG/DL (ref 0–200)
CHOLEST/HDLC SERPL: 3.9 {RATIO}
CO2 SERPL-SCNC: 23 MMOL/L (ref 21–32)
CREAT SERPL-MCNC: 0.76 MG/DL (ref 0.55–1.02)
DIFFERENTIAL METHOD BLD: ABNORMAL
EGFR (NO RACE VARIABLE) (RUSH/TITUS): 99 ML/MIN/1.73M2
EOSINOPHIL # BLD AUTO: 0.22 K/UL (ref 0–0.5)
EOSINOPHIL NFR BLD AUTO: 2.5 % (ref 1–4)
ERYTHROCYTE [DISTWIDTH] IN BLOOD BY AUTOMATED COUNT: 13.8 % (ref 11.5–14.5)
EST. AVERAGE GLUCOSE BLD GHB EST-MCNC: 123 MG/DL
GLOBULIN SER-MCNC: 4 G/DL (ref 2–4)
GLUCOSE SERPL-MCNC: 114 MG/DL (ref 74–106)
GLUCOSE SERPL-MCNC: 114 MG/DL (ref 74–106)
HBA1C MFR BLD HPLC: 5.9 % (ref 4.5–6.6)
HCT VFR BLD AUTO: 43.4 % (ref 38–47)
HDLC SERPL-MCNC: 47 MG/DL (ref 40–60)
HGB BLD-MCNC: 14.5 G/DL (ref 12–16)
IMM GRANULOCYTES # BLD AUTO: 0.06 K/UL (ref 0–0.04)
IMM GRANULOCYTES NFR BLD: 0.7 % (ref 0–0.4)
LDLC SERPL CALC-MCNC: 103 MG/DL
LDLC/HDLC SERPL: 2.2 {RATIO}
LYMPHOCYTES # BLD AUTO: 1.97 K/UL (ref 1–4.8)
LYMPHOCYTES NFR BLD AUTO: 22.4 % (ref 27–41)
MCH RBC QN AUTO: 28.8 PG (ref 27–31)
MCHC RBC AUTO-ENTMCNC: 33.4 G/DL (ref 32–36)
MCV RBC AUTO: 86.3 FL (ref 80–96)
MONOCYTES # BLD AUTO: 0.66 K/UL (ref 0–0.8)
MONOCYTES NFR BLD AUTO: 7.5 % (ref 2–6)
MPC BLD CALC-MCNC: 10.9 FL (ref 9.4–12.4)
NEUTROPHILS # BLD AUTO: 5.83 K/UL (ref 1.8–7.7)
NEUTROPHILS NFR BLD AUTO: 66.2 % (ref 53–65)
NONHDLC SERPL-MCNC: 135 MG/DL
NRBC # BLD AUTO: 0 X10E3/UL
NRBC, AUTO (.00): 0 %
PLATELET # BLD AUTO: 229 K/UL (ref 150–400)
POTASSIUM SERPL-SCNC: 4.1 MMOL/L (ref 3.5–5.1)
PROT SERPL-MCNC: 7.5 G/DL (ref 6.4–8.2)
RBC # BLD AUTO: 5.03 M/UL (ref 4.2–5.4)
SODIUM SERPL-SCNC: 137 MMOL/L (ref 136–145)
TRIGL SERPL-MCNC: 160 MG/DL (ref 35–150)
VLDLC SERPL-MCNC: 32 MG/DL
WBC # BLD AUTO: 8.8 K/UL (ref 4.5–11)

## 2024-01-05 PROCEDURE — 3008F BODY MASS INDEX DOCD: CPT | Mod: ,,,

## 2024-01-05 PROCEDURE — 80061 LIPID PANEL: CPT | Mod: ICN,,, | Performed by: CLINICAL MEDICAL LABORATORY

## 2024-01-05 PROCEDURE — 85025 COMPLETE CBC W/AUTO DIFF WBC: CPT | Mod: ICN,,, | Performed by: CLINICAL MEDICAL LABORATORY

## 2024-01-05 PROCEDURE — 3079F DIAST BP 80-89 MM HG: CPT | Mod: ,,,

## 2024-01-05 PROCEDURE — 83036 HEMOGLOBIN GLYCOSYLATED A1C: CPT | Mod: ICN,,, | Performed by: CLINICAL MEDICAL LABORATORY

## 2024-01-05 PROCEDURE — 1159F MED LIST DOCD IN RCRD: CPT | Mod: ,,,

## 2024-01-05 PROCEDURE — 3074F SYST BP LT 130 MM HG: CPT | Mod: ,,,

## 2024-01-05 PROCEDURE — 80053 COMPREHEN METABOLIC PANEL: CPT | Mod: ICN,,, | Performed by: CLINICAL MEDICAL LABORATORY

## 2024-01-05 PROCEDURE — 1160F RVW MEDS BY RX/DR IN RCRD: CPT | Mod: ,,,

## 2024-01-05 PROCEDURE — 99396 PREV VISIT EST AGE 40-64: CPT | Mod: ,,,

## 2024-01-05 RX ORDER — PANTOPRAZOLE SODIUM 40 MG/1
40 TABLET, DELAYED RELEASE ORAL DAILY
Qty: 90 TABLET | Refills: 1 | Status: SHIPPED | OUTPATIENT
Start: 2024-01-05

## 2024-01-05 RX ORDER — NEBIVOLOL 20 MG/1
1 TABLET ORAL DAILY
Qty: 90 TABLET | Refills: 1 | Status: SHIPPED | OUTPATIENT
Start: 2024-01-05

## 2024-01-05 NOTE — ASSESSMENT & PLAN NOTE
Fasting glucose ordered for healthy you.   I also ordered a Ha1c- her dad had bad DM and  at a early age due to cardiovascular and DM.   Her last A1c was elevated at 5.9 a year ago which is pre diabetic.   Will check today and see if it has improved.   Will call with lab results Monday.

## 2024-01-05 NOTE — PATIENT INSTRUCTIONS
"Patient Education  Increase exercise to at least 30 minutes a day  Eat foods low in salt and low sodium.        High Blood Pressure in Adults   The Basics   Written by the doctors and editors at [facility]   What is high blood pressure? -- High blood pressure is a condition that puts you at risk for heart attack, stroke, and kidney disease. It does not usually cause symptoms. But it can be serious.  When your doctor or nurse tells you your blood pressure, they will say 2 numbers. For instance, your doctor or nurse might say that your blood pressure is "130 over 80." The top number is the pressure inside your arteries when your heart is shaw. The bottom number is the pressure inside your arteries when your heart is relaxed.  "Elevated blood pressure" is a term doctors or nurses use as a warning. People with elevated blood pressure do not yet have high blood pressure. But their blood pressure is not as low as it should be for good health.  Many experts define high, elevated, and normal blood pressure as follows:  High - Top number of 130 or above and/or bottom number of 80 or above  Elevated - Top number between 120 and 129 and bottom number of 79 or below  Normal - Top number of 119 or below and bottom number of 79 or below  This information is also in the table (table 1).   How can I lower my blood pressure? -- If your doctor or nurse has prescribed blood pressure medicine, the most important thing you can do is to take it. If it causes side effects, do not just stop taking it. Instead, talk to your doctor or nurse about the problems it causes. They might be able to lower your dose or switch you to another medicine. If cost is a problem, mention that too. They might be able to put you on a less expensive medicine. Taking your blood pressure medicine can keep you from having a heart attack or stroke, and it can save your life!  Can I do anything on my own? -- You have a lot of control over your blood pressure. " "To lower it:  Lose weight (if you are overweight)  Choose a diet low in fat and rich in fruits, vegetables, and low-fat dairy products  Reduce the amount of salt you eat  Do something active for at least 30 minutes a day on most days of the week  Cut down on alcohol (if you drink more than 2 alcoholic drinks per day)  It's also a good idea to get a home blood pressure meter. People who check their own blood pressure at home do better at keeping it low and can sometimes even reduce the amount of medicine they take.  All topics are updated as new evidence becomes available and our peer review process is complete.  This topic retrieved from [facility] on: Sep 21, 2021.  Topic 07873 Version 15.0  Release: 29.4.2 - C29.263  © 2021 UpToDate, Inc. and/or its affiliates. All rights reserved.  table 1: Definition of normal and high blood pressure  Level  Top number  Bottom number    High 130 or above 80 or above   Elevated 120 to 129 79 or below   Normal 119 or below 79 or below   These definitions are from the American College of Cardiology/American Heart Association. Other expert groups might use slightly different definitions.  "Elevated blood pressure" is a term doctor or nurses use as a warning. It means you do not yet have high blood pressure, but your blood pressure is not as low as it should be for good health.  Graphic 99983 Version 6.0  Consumer Information Use and Disclaimer   This information is not specific medical advice and does not replace information you receive from your health care provider. This is only a brief summary of general information. It does NOT include all information about conditions, illnesses, injuries, tests, procedures, treatments, therapies, discharge instructions or life-style choices that may apply to you. You must talk with your health care provider for complete information about your health and treatment options. This information should not be used to decide whether or not to accept your " health care provider's advice, instructions or recommendations. Only your health care provider has the knowledge and training to provide advice that is right for you. The use of this information is governed by the [employer] End User License Agreement, available at https://www.[employer]/en/solutions/Not iT/about/mackenzie.The use of [employer] content is governed by the [employer] Terms of Use. ©2021 UpToDate, Inc. All rights reserved.  Copyright   © 2021 UpToDate, Inc. and/or its affiliates. All rights reserved.

## 2024-01-05 NOTE — ASSESSMENT & PLAN NOTE
-Bystolic is effective at this time.   -Blood pressure managed well.   -Low salt/sodium diet. Less fried foods, more baked foods, more green leafy vegetables and fruits.   -Exercise at least 30 minutes a day.   -She sees Dr. Hopson at [facility] in [place] every 6 months-1 year for cardiac.   -CBC, CMP today

## 2024-01-05 NOTE — Clinical Note
Hepatitis C Screening Never done Declines HIV Screening Never done declines TETANUS VACCINE Never done declines

## 2024-01-05 NOTE — ASSESSMENT & PLAN NOTE
Continue Protonix 40mg daily  -Avoid spicy, greasy foods  -Avoid caffeine, citric acid, chocolate, peppermint, and carbonated drinks  -Do not lay down within 3 hours of eating  -Exercise 150 minutes per week  -Increase fluid to 64 ounces daily  -Avoid antiinflammatory medications such as motrin, advil, aleve, ibuprofen, and BC powder

## 2024-01-05 NOTE — ASSESSMENT & PLAN NOTE
Healthy you was done today.   Follow up for next healthy you.   Increase exercise  Adhere to cardiac diet. Print outs given.   Follow up as needed with clinic.

## 2024-01-05 NOTE — PROGRESS NOTES
Subjective     Patient ID: Komal Busch is a 43 y.o. female.    Chief Complaint: Healthy You and Health Maintenance (Hepatitis C Screening Never done Declines/HIV Screening Never done declines/TETANUS VACCINE Never done declines/)    Patient is here for her healthy you today. She states everything has been going well. She states she has no problems at this time. Blood pressure has been running good at home. She denies any chest pain or shortness of breath at this time.       Review of Systems   Constitutional: Negative.  Negative for activity change, chills and fever.   HENT: Negative.  Negative for drooling and nosebleeds.    Eyes: Negative.    Respiratory: Negative.  Negative for chest tightness.    Cardiovascular: Negative.  Negative for chest pain and palpitations.   Gastrointestinal: Negative.    Endocrine: Negative.    Genitourinary: Negative.  Negative for difficulty urinating.   Musculoskeletal: Negative.    Integumentary:  Negative.   Allergic/Immunologic: Negative.    Neurological: Negative.  Negative for dizziness.   Hematological: Negative.    Psychiatric/Behavioral: Negative.     All other systems reviewed and are negative.      Tobacco Use: Low Risk  (1/5/2024)    Patient History     Smoking Tobacco Use: Never     Smokeless Tobacco Use: Never     Passive Exposure: Never     Review of patient's allergies indicates:   Allergen Reactions    Keflex [cephalexin]     Neosporin [neomycin-bacitracnzn-polymyxnb]     Pcn [penicillins]     Reglan [metoclopramide]      Current Outpatient Medications   Medication Instructions    aspirin (ECOTRIN) 81 mg, Oral, Daily    nebivoloL (BYSTOLIC) 20 mg Tab 1 tablet, Oral, Daily    pantoprazole (PROTONIX) 40 mg, Oral, Daily     Medications Discontinued During This Encounter   Medication Reason    ergocalciferol (ERGOCALCIFEROL) 50,000 unit Cap Patient no longer taking    neomycin-polymyxin-dexamethasone (DEXACINE) 3.5 mg/g-10,000 unit/g-0.1 % Oint Patient no longer  "taking    pantoprazole (PROTONIX) 40 MG tablet Reorder    nebivoloL (BYSTOLIC) 20 mg Tab Reorder       Past Medical History:   Diagnosis Date    Encounter for screening for COVID-19 9/29/2021    Exposure to COVID-19 virus 7/28/2021    GERD (gastroesophageal reflux disease)     Hypertension     Nonintractable headache 8/16/2021    Pleurisy 10/29/2021     Health Maintenance Topics with due status: Not Due       Topic Last Completion Date    Hemoglobin A1c (Diabetic Prevention Screening) 12/29/2022    Lipid Panel 12/29/2022    Colorectal Cancer Screening 06/26/2023    Mammogram 10/16/2023       There is no immunization history on file for this patient.    Objective     Body mass index is 49.96 kg/m².  Wt Readings from Last 3 Encounters:   01/05/24 (!) 144.7 kg (319 lb)   10/16/23 (!) 142.9 kg (315 lb)   07/10/23 (!) 143.1 kg (315 lb 6.4 oz)     Ht Readings from Last 3 Encounters:   01/05/24 5' 7" (1.702 m)   10/16/23 5' 7" (1.702 m)   07/10/23 5' 7" (1.702 m)     BP Readings from Last 3 Encounters:   01/05/24 125/82   07/10/23 (!) 115/51   06/26/23 124/69     Temp Readings from Last 3 Encounters:   01/05/24 98.5 °F (36.9 °C) (Oral)   06/26/23 98.6 °F (37 °C) (Oral)   03/09/23 97.7 °F (36.5 °C) (Oral)     Pulse Readings from Last 3 Encounters:   01/05/24 79   06/26/23 (!) 51   03/09/23 80     Resp Readings from Last 3 Encounters:   01/05/24 18   06/26/23 17   03/09/23 20     PF Readings from Last 3 Encounters:   No data found for PF       Physical Exam  Constitutional:       General: She is not in acute distress.     Appearance: Normal appearance. She is well-developed. She is obese. She is not ill-appearing.   HENT:      Head: Normocephalic and atraumatic.      Right Ear: Tympanic membrane normal.      Left Ear: Tympanic membrane normal.      Nose: Nose normal.      Mouth/Throat:      Mouth: Mucous membranes are moist.      Pharynx: Oropharynx is clear. No posterior oropharyngeal erythema.   Cardiovascular:      Rate " and Rhythm: Normal rate and regular rhythm.      Pulses: Normal pulses.      Heart sounds: Normal heart sounds.   Pulmonary:      Effort: Pulmonary effort is normal. No accessory muscle usage or respiratory distress.      Breath sounds: Normal breath sounds.   Abdominal:      General: Abdomen is flat. Bowel sounds are normal. There is no distension.      Palpations: Abdomen is soft.      Tenderness: There is no abdominal tenderness.   Musculoskeletal:         General: Normal range of motion.      Cervical back: Normal range of motion.   Skin:     General: Skin is warm and dry.      Capillary Refill: Capillary refill takes less than 2 seconds.   Neurological:      Mental Status: She is alert and oriented to person, place, and time. Mental status is at baseline.   Psychiatric:         Mood and Affect: Mood normal.         Speech: Speech normal.         Behavior: Behavior normal. Behavior is cooperative.         Thought Content: Thought content normal.         Assessment and Plan     Problem List Items Addressed This Visit       Hypertension     -Bystolic is effective at this time.   -Blood pressure managed well.   -Low salt/sodium diet. Less fried foods, more baked foods, more green leafy vegetables and fruits.   -Exercise at least 30 minutes a day.   -She sees Dr. Hopson at [facility] in [place] every 6 months-1 year for cardiac.            Relevant Medications    nebivoloL (BYSTOLIC) 20 mg Tab    Other Relevant Orders    Comprehensive Metabolic Panel    CBC Auto Differential    Gastroesophageal reflux disease     Continue Protonix 40mg daily  -Avoid spicy, greasy foods  -Avoid caffeine, citric acid, chocolate, peppermint, and carbonated drinks  -Do not lay down within 3 hours of eating  -Exercise 150 minutes per week  -Increase fluid to 64 ounces daily  -Avoid antiinflammatory medications such as motrin, advil, aleve, ibuprofen, and BC powder           Relevant Medications    pantoprazole (PROTONIX) 40 MG tablet     Screening for lipoid disorders     Lipid panel- patient is fasting  Will call with results Monday.          Relevant Orders    Lipid Panel    Screening for diabetes mellitus     Fasting glucose ordered for healthy you.   I also ordered a Ha1c- her dad had bad DM and  at a early age due to cardiovascular and DM.   Her last A1c was elevated at 5.9 a year ago which is pre diabetic.   Will check today and see if it has improved.   Will call with lab results Monday.            Relevant Orders    Glucose, Fasting    Hemoglobin A1C    Encounter for general adult medical examination with abnormal findings - Primary     Healthy you was done today.   Follow up for next healthy you.   Increase exercise  Adhere to cardiac diet. Print outs given.   Follow up as needed with clinic.             Plan:       I have reviewed the medications, allergies, and problem list.     Goal Actions:    What type of visit is the patient here for today?: Healthy You  Does the patient consent to enroll in [facility] Me Healthy?: Yes  Is this a Wellness Follow Up?: No  What is your overall wellness goal? (select at least one): Lifestyle modifications  Choose 3: Lifestyle, Nutrition, Exercise  Lifestyle Actions : Obtain yearly mammogram, Take medications as prescribed, Develop good support system  Nurtrition Actions: Recommend weight loss, Eat a well-balanced diet, drink 8-10 glasses of water per day  Exercise Actions: Recommend physical activity 30 minutes per day 3-5 times/week

## 2024-01-08 ENCOUNTER — PATIENT OUTREACH (OUTPATIENT)
Dept: ADMINISTRATIVE | Facility: HOSPITAL | Age: 44
End: 2024-01-08

## 2024-01-08 NOTE — PROGRESS NOTES
Population Health Chart Review & Patient Outreach Details      Further Action Needed If Patient Returns Outreach:            Updates Requested / Reviewed:     []  Care Everywhere    []     []  External Sources ([facility], [facility], [facility], etc.)    [] [facility]   [] [facility]   [] Other:    []  Care Team Updated   []  Removed  or Duplicate Orders   []  Immunization Reconciliation Completed / Queried    [] [place]   [] [place]   [] [place]   [] [place]      Health Maintenance Topics Addressed and Outreach Outcomes / Actions Taken:             Breast Cancer Screening []  Mammogram Order Placed    []  Mammogram Screening Scheduled    []  External Records Requested & Care Team Updated if Applicable    []  External Records Uploaded & Care Team Updated if Applicable    []  Pt Declined Scheduling Mammogram    []  Pt Will Schedule with External Provider / Order Routed & Care Team Updated if Applicable              Cervical Cancer Screening []  Pap Smear Scheduled in Primary Care or OBGYN    []  External Records Requested & Care Team Updated if Applicable       []  External Records Uploaded, Care Team Updated, & History Updated if Applicable    []  Patient Declined Scheduling Pap Smear    []  Patient Will Schedule with External Provider & Care Team Updated if Applicable                  Colorectal Cancer Screening []  Colonoscopy Case Request / Referral / Home Test Order Placed    []  External Records Requested & Care Team Updated if Applicable    []  External Records Uploaded, Care Team Updated, & History Updated if Applicable    []  Patient Declined Completing Colon Cancer Screening    []  Patient Will Schedule with External Provider & Care Team Updated if Applicable    []  Fit Kit Mailed (add the SmartPhrase under additional notes)    []  Reminded Patient to Complete Home Test                Diabetic Eye Exam []  Eye Exam Screening Order Placed    []  Eye Camera Scheduled or Optometry/Ophthalmology Referral  Placed    []  External Records Requested & Care Team Updated if Applicable    []  External Records Uploaded, Care Team Updated, & History Updated if Applicable    []  Patient Declined Scheduling Eye Exam    []  Patient Will Schedule with External Provider & Care Team Updated if Applicable             Blood Pressure Control []  Primary Care Follow Up Visit Scheduled     []  Remote Blood Pressure Reading Captured    []  Patient Declined Remote Reading or Scheduling Appt - Escalated to PCP    []  Patient Will Call Back or Send Portal Message with Reading                 HbA1c & Other Labs []  Overdue Lab(s) Ordered    []  Overdue Lab(s) Scheduled    []  External Records Uploaded & Care Team Updated if Applicable    []  Primary Care Follow Up Visit Scheduled     []  Reminded Patient to Complete A1c Home Test    []  Patient Declined Scheduling Labs or Will Call Back to Schedule    []  Patient Will Schedule with External Provider / Order Routed, & Care Team Updated if Applicable           Primary Care Appointment []  Primary Care Appt Scheduled    []  Patient Declined Scheduling or Will Call Back to Schedule    []  Pt Established with External Provider, Updated Care Team, & Informed Pt to Notify Payor if Applicable           Medication Adherence /    Statin Use []  Primary Care Appointment Scheduled    []  Patient Reminded to  Prescription    []  Patient Declined, Provider Notified if Needed    []  Sent Provider Message to Review to Evaluate Pt for Statin, Add Exclusion Dx Codes, Document   Exclusion in Problem List, Change Statin Intensity Level to Moderate or High Intensity if Applicable                Osteoporosis Screening []  Dexa Order Placed    []  Dexa Appointment Scheduled    []  External Records Requested & Care Team Updated    []  External Records Uploaded, Care Team Updated, & History Updated if Applicable    []  Patient Declined Scheduling Dexa or Will Call Back to Schedule    []  Patient Will Schedule  with External Provider / Order Routed & Care Team Updated if Applicable       Additional Notes:.  Post visit Population Health review of encounter with date of service  1/5/24 with Paul.  All required HY components in encounter.    Followup appt for: 1/3/2025 HY

## 2024-01-08 NOTE — PROGRESS NOTES
Triglycerides slightly elevated. Life style modifications such as low fat diet, less fried foods, more baked foods. Exercise daily. She is also pre diabetic. Her A1C is the same as it was a year ago, it has not went up any so that is good. Low carb diet as much as possible, less sugar. Follow up in one year.

## 2024-01-31 ENCOUNTER — OFFICE VISIT (OUTPATIENT)
Dept: FAMILY MEDICINE | Facility: CLINIC | Age: 44
End: 2024-01-31
Payer: COMMERCIAL

## 2024-01-31 VITALS
WEIGHT: 293 LBS | RESPIRATION RATE: 20 BRPM | HEART RATE: 65 BPM | TEMPERATURE: 98 F | DIASTOLIC BLOOD PRESSURE: 78 MMHG | HEIGHT: 67 IN | OXYGEN SATURATION: 98 % | SYSTOLIC BLOOD PRESSURE: 141 MMHG | BODY MASS INDEX: 45.99 KG/M2

## 2024-01-31 DIAGNOSIS — R51.9 ACUTE NONINTRACTABLE HEADACHE, UNSPECIFIED HEADACHE TYPE: Primary | ICD-10-CM

## 2024-01-31 LAB
CRP SERPL-MCNC: 1.12 MG/DL (ref 0–0.8)
CTP QC/QA: YES
ERYTHROCYTE [SEDIMENTATION RATE] IN BLOOD BY WESTERGREN METHOD: 2 MM/HR (ref 0–20)
FLUAV AG NPH QL: NEGATIVE
FLUBV AG NPH QL: NEGATIVE

## 2024-01-31 PROCEDURE — 96372 THER/PROPH/DIAG INJ SC/IM: CPT | Mod: ,,,

## 2024-01-31 PROCEDURE — 3078F DIAST BP <80 MM HG: CPT | Mod: ,,,

## 2024-01-31 PROCEDURE — 85651 RBC SED RATE NONAUTOMATED: CPT | Mod: ,,, | Performed by: CLINICAL MEDICAL LABORATORY

## 2024-01-31 PROCEDURE — 86140 C-REACTIVE PROTEIN: CPT | Mod: ,,, | Performed by: CLINICAL MEDICAL LABORATORY

## 2024-01-31 PROCEDURE — 3008F BODY MASS INDEX DOCD: CPT | Mod: ,,,

## 2024-01-31 PROCEDURE — 87804 INFLUENZA ASSAY W/OPTIC: CPT | Mod: 59,QW,,

## 2024-01-31 PROCEDURE — 1160F RVW MEDS BY RX/DR IN RCRD: CPT | Mod: ,,,

## 2024-01-31 PROCEDURE — 3077F SYST BP >= 140 MM HG: CPT | Mod: ,,,

## 2024-01-31 PROCEDURE — 1159F MED LIST DOCD IN RCRD: CPT | Mod: ,,,

## 2024-01-31 PROCEDURE — 3044F HG A1C LEVEL LT 7.0%: CPT | Mod: ,,,

## 2024-01-31 PROCEDURE — 99214 OFFICE O/P EST MOD 30 MIN: CPT | Mod: 25,,,

## 2024-01-31 RX ORDER — PROMETHAZINE HYDROCHLORIDE 12.5 MG/1
12.5 TABLET ORAL EVERY 6 HOURS PRN
Qty: 30 TABLET | Refills: 0 | Status: SHIPPED | OUTPATIENT
Start: 2024-01-31

## 2024-01-31 RX ORDER — SUMATRIPTAN SUCCINATE 25 MG/1
25 TABLET ORAL EVERY 6 HOURS PRN
Qty: 30 TABLET | Refills: 0 | Status: SHIPPED | OUTPATIENT
Start: 2024-01-31

## 2024-01-31 RX ORDER — KETOROLAC TROMETHAMINE 10 MG/1
10 TABLET, FILM COATED ORAL EVERY 6 HOURS PRN
Qty: 15 TABLET | Refills: 0 | Status: SHIPPED | OUTPATIENT
Start: 2024-01-31 | End: 2024-02-05

## 2024-01-31 RX ORDER — KETOROLAC TROMETHAMINE 30 MG/ML
30 INJECTION, SOLUTION INTRAMUSCULAR; INTRAVENOUS
Status: COMPLETED | OUTPATIENT
Start: 2024-01-31 | End: 2024-01-31

## 2024-01-31 RX ADMIN — KETOROLAC TROMETHAMINE 30 MG: 30 INJECTION, SOLUTION INTRAMUSCULAR; INTRAVENOUS at 02:01

## 2024-01-31 NOTE — ASSESSMENT & PLAN NOTE
CT of head-  Sed Rate, CRP- R/O GCA  Toradol 30mg IM  Toradol 10mg as needed every 6 hours  She did not have someone to drive her so prescribed Phenergan 12.5 po as needed for nausea/headache.   Imitrex 25 mg every 6 hours as needed   If s/s worsen go immediately to ER  Follow up with clinic if needed  Work excuse until Monday

## 2024-01-31 NOTE — PATIENT INSTRUCTIONS
Ct of head   Brain rest   Stop reading books, looking at phone, working and rest your brain until Monday  Increase fluid intake  Phenergan as needed   Toradol as needed every 6 hours for headache  Follow up with the clinic if does not improve  Will call you with CT results as soon as I get them  Go to local ER if pain gets worse  Will call you with lab results.  Imtrex as needed for headache

## 2024-01-31 NOTE — LETTER
January 31, 2024      [facility] [address] [place] [address]  Phone: [phone]  Fax: [phone]       Patient: Komal Busch   YOB: 1980  Date of Visit: 01/31/2024    To Whom It May Concern:    Ponce Busch  was at [facility] on 01/31/2024. The patient may return to work/school on 02/05/2024 with no restrictions. If you have any questions or concerns, or if I can be of further assistance, please do not hesitate to contact me.    Sincerely,  HAMZAH Guzman LPN
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
## 2024-02-01 ENCOUNTER — HOSPITAL ENCOUNTER (OUTPATIENT)
Dept: RADIOLOGY | Facility: HOSPITAL | Age: 44
Discharge: HOME | End: 2024-02-01
Payer: COMMERCIAL

## 2024-02-01 DIAGNOSIS — R51.9 ACUTE NONINTRACTABLE HEADACHE, UNSPECIFIED HEADACHE TYPE: ICD-10-CM

## 2024-02-01 PROCEDURE — 70450 CT HEAD/BRAIN W/O DYE: CPT | Mod: TC

## 2024-02-01 NOTE — PROGRESS NOTES
Contacted patient with CT and lab results and instructions with understanding verbalized. Patient reports she is feeling a little better but not great. Her head is not hurting as bad as it was.

## 2024-02-01 NOTE — PROGRESS NOTES
Labs okay. CRP is slightly elevated, it looks like it was a year ago as well. Other lab normal. Should hear from CT scan soon. Follow up if she needs anything.

## 2024-04-08 PROBLEM — Z00.01 ENCOUNTER FOR GENERAL ADULT MEDICAL EXAMINATION WITH ABNORMAL FINDINGS: Status: RESOLVED | Noted: 2024-01-05 | Resolved: 2024-04-08

## 2024-04-08 PROBLEM — Z13.1 SCREENING FOR DIABETES MELLITUS: Status: RESOLVED | Noted: 2024-01-05 | Resolved: 2024-04-08

## 2024-04-08 PROBLEM — Z13.220 SCREENING FOR LIPOID DISORDERS: Status: RESOLVED | Noted: 2024-01-05 | Resolved: 2024-04-08

## 2024-05-22 ENCOUNTER — OFFICE VISIT (OUTPATIENT)
Dept: FAMILY MEDICINE | Facility: CLINIC | Age: 44
End: 2024-05-22
Payer: COMMERCIAL

## 2024-05-22 VITALS
HEIGHT: 67 IN | DIASTOLIC BLOOD PRESSURE: 81 MMHG | OXYGEN SATURATION: 98 % | HEART RATE: 78 BPM | BODY MASS INDEX: 45.99 KG/M2 | WEIGHT: 293 LBS | RESPIRATION RATE: 18 BRPM | TEMPERATURE: 98 F | SYSTOLIC BLOOD PRESSURE: 136 MMHG

## 2024-05-22 DIAGNOSIS — I10 HYPERTENSION, UNSPECIFIED TYPE: ICD-10-CM

## 2024-05-22 DIAGNOSIS — R10.12 LEFT UPPER QUADRANT ABDOMINAL PAIN: Primary | ICD-10-CM

## 2024-05-22 DIAGNOSIS — R30.0 DYSURIA: ICD-10-CM

## 2024-05-22 PROBLEM — R10.9 FLANK PAIN: Status: ACTIVE | Noted: 2024-05-22

## 2024-05-22 LAB
ALBUMIN SERPL BCP-MCNC: 3.7 G/DL (ref 3.5–5)
ALBUMIN/GLOB SERPL: 0.9 {RATIO}
ALP SERPL-CCNC: 101 U/L (ref 39–100)
ALT SERPL W P-5'-P-CCNC: 28 U/L (ref 13–56)
ANION GAP SERPL CALCULATED.3IONS-SCNC: 10 MMOL/L (ref 7–16)
AST SERPL W P-5'-P-CCNC: 25 U/L (ref 15–37)
BILIRUB SERPL-MCNC: 0.8 MG/DL (ref ?–1.2)
BILIRUB SERPL-MCNC: NORMAL MG/DL
BLOOD URINE, POC: NORMAL
BUN SERPL-MCNC: 21 MG/DL (ref 7–18)
BUN/CREAT SERPL: 26 (ref 6–20)
CALCIUM SERPL-MCNC: 9.4 MG/DL (ref 8.5–10.1)
CHLORIDE SERPL-SCNC: 105 MMOL/L (ref 98–107)
CO2 SERPL-SCNC: 24 MMOL/L (ref 21–32)
COLOR, POC UA: YELLOW
CREAT SERPL-MCNC: 0.81 MG/DL (ref 0.55–1.02)
EGFR (NO RACE VARIABLE) (RUSH/TITUS): 91 ML/MIN/1.73M2
GLOBULIN SER-MCNC: 4 G/DL (ref 2–4)
GLUCOSE SERPL-MCNC: 107 MG/DL (ref 74–106)
GLUCOSE UR QL STRIP: NORMAL
KETONES UR QL STRIP: NORMAL
LEUKOCYTE ESTERASE URINE, POC: NORMAL
NITRITE, POC UA: NORMAL
PH, POC UA: 6
POTASSIUM SERPL-SCNC: 3.9 MMOL/L (ref 3.5–5.1)
PROT SERPL-MCNC: 7.7 G/DL (ref 6.4–8.2)
PROTEIN, POC: NORMAL
SODIUM SERPL-SCNC: 135 MMOL/L (ref 136–145)
SPECIFIC GRAVITY, POC UA: 1.03
UROBILINOGEN, POC UA: 0.2

## 2024-05-22 PROCEDURE — 3044F HG A1C LEVEL LT 7.0%: CPT | Mod: ,,,

## 2024-05-22 PROCEDURE — 99214 OFFICE O/P EST MOD 30 MIN: CPT | Mod: ,,,

## 2024-05-22 PROCEDURE — 80053 COMPREHEN METABOLIC PANEL: CPT | Mod: ,,, | Performed by: CLINICAL MEDICAL LABORATORY

## 2024-05-22 PROCEDURE — 1159F MED LIST DOCD IN RCRD: CPT | Mod: ,,,

## 2024-05-22 PROCEDURE — 3008F BODY MASS INDEX DOCD: CPT | Mod: ,,,

## 2024-05-22 PROCEDURE — 81003 URINALYSIS AUTO W/O SCOPE: CPT | Mod: QW,,,

## 2024-05-22 PROCEDURE — 36415 COLL VENOUS BLD VENIPUNCTURE: CPT

## 2024-05-22 PROCEDURE — 3079F DIAST BP 80-89 MM HG: CPT | Mod: ,,,

## 2024-05-22 PROCEDURE — 1160F RVW MEDS BY RX/DR IN RCRD: CPT | Mod: ,,,

## 2024-05-22 PROCEDURE — 3075F SYST BP GE 130 - 139MM HG: CPT | Mod: ,,,

## 2024-05-22 NOTE — PROGRESS NOTES
HPI:   Komal Busch is a pleasant 44 y.o. patient who reports to clinic with complaints of Left sided abdomen/rib pain. She states pain was in her left side of her abdomen and the pain has moved up to her rib area. She denies an injury. She states it has been going on a couple days now. She states the pain comes and goes. She states the pain today has been more intense and more often. She states that she decided to see what it is because the pain become more intense today. She states the pain is not effected when she moves or positional. She states she has had a kidney stone in the past and the pain feels similar to that. She denies any back pain at all. She denies any constipation. She states she is burning when she urinates. She states she isn't having frequency or urgency at this point. he stats pain is 9 out of 10 and she has felt this pain 5-6 times. She states yesterday maybe 2-3 times.No nausea or vomitting. She states she feels fine otherwise. She denies any other complains. She denies any shortness of breath or chest pain. She stats pain is 9 out of 10 and she has felt this pain 5-6 times. She states yesterday maybe 2-3 times.No nausea or vomitting. She denies any recent injury or fall. She denies fever or ill feeling. Patient is smiling, laughing and very friendly on exam.                 Past Medical History:   Diagnosis Date    Encounter for screening for COVID-19 9/29/2021    Exposure to COVID-19 virus 7/28/2021    GERD (gastroesophageal reflux disease)     Hypertension     Nonintractable headache 8/16/2021    Pleurisy 10/29/2021       PAST SURGICAL HISTORY:   Past Surgical History:   Procedure Laterality Date    CHOLECYSTECTOMY      HYSTERECTOMY      KNEE SURGERY Left        MEDICATIONS:    Current Outpatient Medications:     aspirin (ECOTRIN) 81 MG EC tablet, Take 81 mg by mouth once daily., Disp: , Rfl:     nebivoloL (BYSTOLIC) 20 mg Tab, Take 1 tablet by mouth Daily., Disp: 90 tablet, Rfl:  1    pantoprazole (PROTONIX) 40 MG tablet, Take 1 tablet (40 mg total) by mouth once daily., Disp: 90 tablet, Rfl: 1    promethazine (PHENERGAN) 12.5 MG Tab, Take 1 tablet (12.5 mg total) by mouth every 6 (six) hours as needed (nausea vomitting)., Disp: 30 tablet, Rfl: 0    sumatriptan (IMITREX) 25 MG Tab, Take 1 tablet (25 mg total) by mouth every 6 (six) hours as needed (headache)., Disp: 30 tablet, Rfl: 0    ALLERGIES:   Review of patient's allergies indicates:   Allergen Reactions    Keflex [cephalexin]     Neosporin [neomycin-bacitracnzn-polymyxnb]     Pcn [penicillins]     Reglan [metoclopramide]          Review of Systems   Constitutional: Negative.  Negative for activity change, chills and fever.   HENT: Negative.  Negative for drooling and nosebleeds.    Eyes: Negative.    Respiratory: Negative.  Negative for chest tightness.    Cardiovascular: Negative.  Negative for chest pain and palpitations.   Gastrointestinal:  Positive for abdominal pain (left upper quad and rib area).   Endocrine: Negative.    Genitourinary:  Positive for dysuria (burning). Negative for difficulty urinating, flank pain, frequency and urgency.   Musculoskeletal: Negative.    Integumentary:  Negative.   Allergic/Immunologic: Negative.    Neurological: Negative.  Negative for dizziness.   Hematological: Negative.    Psychiatric/Behavioral: Negative.     All other systems reviewed and are negative.         Physical Exam  Constitutional:       General: She is not in acute distress.     Appearance: Normal appearance. She is well-developed. She is obese. She is not ill-appearing.   HENT:      Head: Normocephalic and atraumatic.      Right Ear: Tympanic membrane normal.      Left Ear: Tympanic membrane normal.      Nose: Nose normal.      Mouth/Throat:      Mouth: Mucous membranes are moist.      Pharynx: Oropharynx is clear. No posterior oropharyngeal erythema.   Cardiovascular:      Rate and Rhythm: Normal rate and regular rhythm.       "Pulses: Normal pulses.      Heart sounds: Normal heart sounds.   Pulmonary:      Effort: Pulmonary effort is normal. No accessory muscle usage or respiratory distress.      Breath sounds: Normal breath sounds.   Abdominal:      General: Abdomen is flat. Bowel sounds are normal. There is no distension.      Palpations: Abdomen is soft.      Tenderness: There is no abdominal tenderness. There is no right CVA tenderness, left CVA tenderness or guarding.   Musculoskeletal:         General: Normal range of motion.      Cervical back: Normal range of motion.   Skin:     General: Skin is warm and dry.      Capillary Refill: Capillary refill takes less than 2 seconds.   Neurological:      Mental Status: She is alert and oriented to person, place, and time. Mental status is at baseline.   Psychiatric:         Mood and Affect: Mood normal.         Speech: Speech normal.         Behavior: Behavior normal. Behavior is cooperative.         Thought Content: Thought content normal.          VITAL SIGNS:   /81 (BP Location: Left arm, Patient Position: Sitting)   Pulse 78   Temp 98.3 °F (36.8 °C)   Resp 18   Ht 5' 7" (1.702 m)   Wt (!) 142 kg (313 lb)   SpO2 98%   BMI 49.02 kg/m²       ASSESSMENT/PLAN  1. Left upper quadrant abdominal pain  Assessment & Plan:  CBC and CMP today- will call with results.  KUB Impression: Nonspecific bowel gas pattern, otherwise it was negative.  Use heating pad at home  CT ordered, if pain gets worse or doesn't improve go to the ER.   RTC if needed      Orders:  -     POCT URINALYSIS W/O SCOPE  -     X-Ray KUB; Future; Expected date: 05/22/2024  -     Cancel: CBC Auto Differential; Future; Expected date: 05/22/2024  -     CT Abdomen Pelvis  Without Contrast; Future; Expected date: 05/22/2024  -     Cancel: Comprehensive Metabolic Panel; Future; Expected date: 05/22/2024  -     CBC Auto Differential; Future; Expected date: 05/22/2024  -     Comprehensive Metabolic Panel; Future; Expected " date: 05/22/2024  -     Urine culture    2. Dysuria  Assessment & Plan:  Burning with urination at times. No frequency. No urgency.   UA- negative  Ordered Culture      Other orders  -     Cancel: CT Abdomen Pelvis  Without Contrast; Future; Expected date: 05/22/2024             Patient Instructions   -KUB today will call with results   -CT Abdomen ordered - it will take a couple days to get it pre certed. If pain continues or does not improve go to local ER for CT.   -UA- normal. Ordered a culture to be sure. Should result in a couple of days  -RTC if s/s persist or do not improve.  Rotate tylenol and Ibprofen for pain  Heating pad    Orders Placed This Encounter   Procedures    Urine culture          X-Ray KUB     Standing Status:   Future     Number of Occurrences:   1     Standing Expiration Date:   5/22/2025     Order Specific Question:   May the Radiologist modify the order per protocol to meet the clinical needs of the patient?     Answer:   Yes     Order Specific Question:   Release to patient     Answer:   Immediate    CT Abdomen Pelvis  Without Contrast     Standing Status:   Future     Standing Expiration Date:   5/22/2025     Order Specific Question:   Oral/Rectal Contrast instructions:     Answer:   NO Oral Contrast     Order Specific Question:   Special CT ABD Protocol Request?     Answer:   Routine     Order Specific Question:   May the Radiologist modify the order per protocol to meet the clinical needs of the patient?     Answer:   Yes    CBC Auto Differential           Standing Status:   Future     Number of Occurrences:   1     Standing Expiration Date:   8/20/2025    Comprehensive Metabolic Panel           Standing Status:   Future     Number of Occurrences:   1     Standing Expiration Date:   8/20/2025    CBC with Differential          POCT URINALYSIS W/O SCOPE

## 2024-05-22 NOTE — PATIENT INSTRUCTIONS
-KUB today will call with results   -CT Abdomen ordered - it will take a couple days to get it pre certed. If pain continues or does not improve go to local ER for CT.   -UA- normal. Ordered a culture to be sure. Should result in a couple of days  -RTC if s/s persist or do not improve.  Rotate tylenol and Ibprofen for pain  Heating pad

## 2024-05-22 NOTE — ASSESSMENT & PLAN NOTE
CBC and CMP today- will call with results.  KUB Impression: Nonspecific bowel gas pattern, otherwise it was negative.  Use heating pad at home  CT ordered, if pain gets worse or doesn't improve go to the ER.   RTC if needed

## 2024-05-23 PROCEDURE — 87086 URINE CULTURE/COLONY COUNT: CPT | Mod: ,,, | Performed by: CLINICAL MEDICAL LABORATORY

## 2024-05-23 NOTE — PROGRESS NOTES
Labs look good. I ordered a CBC and it is not on here? Maybe it will result later. Please check and make sure it was collected and sent.

## 2024-05-24 ENCOUNTER — TELEPHONE (OUTPATIENT)
Dept: FAMILY MEDICINE | Facility: CLINIC | Age: 44
End: 2024-05-24
Payer: COMMERCIAL

## 2024-05-24 NOTE — TELEPHONE ENCOUNTER
Called pt this Am, pt states she is feeling much better. I was calling for a recollect of lab for CBC, that had an error in collection and transfer. Pt stated she did not need to come in for recollect as she was feeling much better. FNP advised.

## 2024-05-25 LAB — UA COMPLETE W REFLEX CULTURE PNL UR: NO GROWTH

## 2024-07-24 ENCOUNTER — PATIENT OUTREACH (OUTPATIENT)
Facility: HOSPITAL | Age: 44
End: 2024-07-24
Payer: COMMERCIAL

## 2024-07-24 NOTE — PROGRESS NOTES
Population Health Chart Review & Patient Outreach Details  Per [facility] website, insurance is active and patient is enrolled in CM:  CM! Provider CM! Benefit Start Date CMH! Benefit End Date Baseline Risk Track(s) Baseline Risk Level Current Risk Tracks(s) Current Risk Level   SHANKARSUSAN MARCUMKARINA RAJAN 2024 Hypertension, Glucose, Cholesterol Low Hypertension, Glucose, Cholesterol Low               The Color Me Healthy! biometrics entry should be used only for biometrics associated with Color Me Healthy! services.     Color Me Healthy! biometrics must be submitted by a [employer] Provider. Please enter the [employer] Provider's NPI to submit biometrics.  Provider Search   Performing Provider NPI:               The services below are available to the member under Color Me Healthy! when performed by a [employer] Provider.  Benefit accumulators are based on Color Me Healthy! benefit periods.  Color Me Healthy! Services Guide   Color Me Healthy! Services  CPT Codes     A1C  (0 of 2 Completed) View CPT Codes »    Lipid Profile  (0 of 2 Completed) View CPT Codes »    Metabolic Visit  (0 of 2 Completed) View CPT Codes »    Microalbumin  (0 of 1 Completed) View CPT Codes »    Medication Monitoring-Renal  (0 of 2 Completed) View CPT Codes »    Medication Monitoring-Liver  (0 of 2 Completed) View CPT Codes »    Dilated Eye Exam  (0 of 1 Completed) View CPT Codes »    Venipuncture  (0 of 2 Completed) View CPT Codes »         Further Action Needed If Patient Returns Outreach:            Updates Requested / Reviewed:     []  Care Everywhere    []     []  External Sources ([facility], [facility], [facility], etc.)    [] [facility]   [] [facility]   [] Other:    []  Care Team Updated   []  Removed  or Duplicate Orders   []  Immunization Reconciliation Completed / Queried    [] [place]   [] [place]   [] [place]   [] [place]      Health Maintenance Topics Addressed and Outreach  Outcomes / Actions Taken:             Breast Cancer Screening []  Mammogram Order Placed    []  Mammogram Screening Scheduled    []  External Records Requested & Care Team Updated if Applicable    []  External Records Uploaded & Care Team Updated if Applicable    []  Pt Declined Scheduling Mammogram    []  Pt Will Schedule with External Provider / Order Routed & Care Team Updated if Applicable              Cervical Cancer Screening []  Pap Smear Scheduled in Primary Care or OBGYN    []  External Records Requested & Care Team Updated if Applicable       []  External Records Uploaded, Care Team Updated, & History Updated if Applicable    []  Patient Declined Scheduling Pap Smear    []  Patient Will Schedule with External Provider & Care Team Updated if Applicable                  Colorectal Cancer Screening []  Colonoscopy Case Request / Referral / Home Test Order Placed    []  External Records Requested & Care Team Updated if Applicable    []  External Records Uploaded, Care Team Updated, & History Updated if Applicable    []  Patient Declined Completing Colon Cancer Screening    []  Patient Will Schedule with External Provider & Care Team Updated if Applicable    []  Fit Kit Mailed (add the SmartPhrase under additional notes)    []  Reminded Patient to Complete Home Test                Diabetic Eye Exam []  Eye Exam Screening Order Placed    []  Eye Camera Scheduled or Optometry/Ophthalmology Referral Placed    []  External Records Requested & Care Team Updated if Applicable    []  External Records Uploaded, Care Team Updated, & History Updated if Applicable    []  Patient Declined Scheduling Eye Exam    []  Patient Will Schedule with External Provider & Care Team Updated if Applicable             Blood Pressure Control []  Primary Care Follow Up Visit Scheduled     []  Remote Blood Pressure Reading Captured    []  Patient Declined Remote Reading or Scheduling Appt - Escalated to PCP    []  Patient Will Call Back or  Send Portal Message with Reading                 HbA1c & Other Labs []  Overdue Lab(s) Ordered    []  Overdue Lab(s) Scheduled    []  External Records Uploaded & Care Team Updated if Applicable    []  Primary Care Follow Up Visit Scheduled     []  Reminded Patient to Complete A1c Home Test    []  Patient Declined Scheduling Labs or Will Call Back to Schedule    []  Patient Will Schedule with External Provider / Order Routed, & Care Team Updated if Applicable           Primary Care Appointment []  Primary Care Appt Scheduled    []  Patient Declined Scheduling or Will Call Back to Schedule    []  Pt Established with External Provider, Updated Care Team, & Informed Pt to Notify Payor if Applicable           Medication Adherence /    Statin Use []  Primary Care Appointment Scheduled    []  Patient Reminded to  Prescription    []  Patient Declined, Provider Notified if Needed    []  Sent Provider Message to Review to Evaluate Pt for Statin, Add Exclusion Dx Codes, Document   Exclusion in Problem List, Change Statin Intensity Level to Moderate or High Intensity if Applicable                Osteoporosis Screening []  Dexa Order Placed    []  Dexa Appointment Scheduled    []  External Records Requested & Care Team Updated    []  External Records Uploaded, Care Team Updated, & History Updated if Applicable    []  Patient Declined Scheduling Dexa or Will Call Back to Schedule    []  Patient Will Schedule with External Provider / Order Routed & Care Team Updated if Applicable       Additional Notes:

## 2024-08-28 PROBLEM — E11.8 DIABETES MELLITUS TYPE 2 WITH COMPLICATIONS: Status: ACTIVE | Noted: 2024-08-28

## 2024-08-28 PROBLEM — E78.5 HYPERLIPIDEMIA: Status: ACTIVE | Noted: 2024-08-28

## 2024-09-04 ENCOUNTER — OFFICE VISIT (OUTPATIENT)
Dept: FAMILY MEDICINE | Facility: CLINIC | Age: 44
End: 2024-09-04
Payer: COMMERCIAL

## 2024-09-04 VITALS
BODY MASS INDEX: 45.99 KG/M2 | HEART RATE: 63 BPM | RESPIRATION RATE: 18 BRPM | WEIGHT: 293 LBS | HEIGHT: 67 IN | TEMPERATURE: 98 F | OXYGEN SATURATION: 98 % | DIASTOLIC BLOOD PRESSURE: 79 MMHG | SYSTOLIC BLOOD PRESSURE: 127 MMHG

## 2024-09-04 DIAGNOSIS — Z13.1 SCREENING FOR DIABETES MELLITUS (DM): ICD-10-CM

## 2024-09-04 DIAGNOSIS — K21.9 GASTROESOPHAGEAL REFLUX DISEASE, UNSPECIFIED WHETHER ESOPHAGITIS PRESENT: ICD-10-CM

## 2024-09-04 DIAGNOSIS — E78.5 HYPERLIPIDEMIA, UNSPECIFIED HYPERLIPIDEMIA TYPE: Primary | ICD-10-CM

## 2024-09-04 DIAGNOSIS — I10 HYPERTENSION, UNSPECIFIED TYPE: ICD-10-CM

## 2024-09-04 LAB
ALBUMIN SERPL BCP-MCNC: 3.8 G/DL (ref 3.5–5)
ALBUMIN/GLOB SERPL: 1 {RATIO}
ALP SERPL-CCNC: 85 U/L (ref 39–100)
ALT SERPL W P-5'-P-CCNC: 27 U/L (ref 13–56)
ANION GAP SERPL CALCULATED.3IONS-SCNC: 13 MMOL/L (ref 7–16)
AST SERPL W P-5'-P-CCNC: 19 U/L (ref 15–37)
BASOPHILS # BLD AUTO: 0.04 K/UL (ref 0–0.2)
BASOPHILS NFR BLD AUTO: 0.4 % (ref 0–1)
BILIRUB SERPL-MCNC: 0.8 MG/DL (ref ?–1.2)
BUN SERPL-MCNC: 17 MG/DL (ref 7–18)
BUN/CREAT SERPL: 23 (ref 6–20)
CALCIUM SERPL-MCNC: 9.4 MG/DL (ref 8.5–10.1)
CHLORIDE SERPL-SCNC: 105 MMOL/L (ref 98–107)
CHOLEST SERPL-MCNC: 193 MG/DL (ref 0–200)
CHOLEST/HDLC SERPL: 3.9 {RATIO}
CO2 SERPL-SCNC: 26 MMOL/L (ref 21–32)
CREAT SERPL-MCNC: 0.75 MG/DL (ref 0.55–1.02)
CREAT UR-MCNC: 117 MG/DL (ref 28–219)
DIFFERENTIAL METHOD BLD: ABNORMAL
EGFR (NO RACE VARIABLE) (RUSH/TITUS): 100 ML/MIN/1.73M2
EOSINOPHIL # BLD AUTO: 0.33 K/UL (ref 0–0.5)
EOSINOPHIL NFR BLD AUTO: 3.7 % (ref 1–4)
ERYTHROCYTE [DISTWIDTH] IN BLOOD BY AUTOMATED COUNT: 13.7 % (ref 11.5–14.5)
EST. AVERAGE GLUCOSE BLD GHB EST-MCNC: 117 MG/DL
GLOBULIN SER-MCNC: 3.7 G/DL (ref 2–4)
GLUCOSE SERPL-MCNC: 80 MG/DL (ref 74–106)
HBA1C MFR BLD HPLC: 5.7 % (ref 4.5–6.6)
HCT VFR BLD AUTO: 46.3 % (ref 38–47)
HDLC SERPL-MCNC: 49 MG/DL (ref 40–60)
HGB BLD-MCNC: 14.9 G/DL (ref 12–16)
IMM GRANULOCYTES # BLD AUTO: 0.04 K/UL (ref 0–0.04)
IMM GRANULOCYTES NFR BLD: 0.4 % (ref 0–0.4)
LDLC SERPL CALC-MCNC: 105 MG/DL
LDLC/HDLC SERPL: 2.1 {RATIO}
LYMPHOCYTES # BLD AUTO: 2.19 K/UL (ref 1–4.8)
LYMPHOCYTES NFR BLD AUTO: 24.5 % (ref 27–41)
MCH RBC QN AUTO: 28.1 PG (ref 27–31)
MCHC RBC AUTO-ENTMCNC: 32.2 G/DL (ref 32–36)
MCV RBC AUTO: 87.4 FL (ref 80–96)
MICROALBUMIN UR-MCNC: 1.4 MG/DL (ref 0–2.8)
MICROALBUMIN/CREAT RATIO PNL UR: 12 MG/G (ref 0–30)
MONOCYTES # BLD AUTO: 0.67 K/UL (ref 0–0.8)
MONOCYTES NFR BLD AUTO: 7.5 % (ref 2–6)
MPC BLD CALC-MCNC: 12.1 FL (ref 9.4–12.4)
NEUTROPHILS # BLD AUTO: 5.66 K/UL (ref 1.8–7.7)
NEUTROPHILS NFR BLD AUTO: 63.5 % (ref 53–65)
NONHDLC SERPL-MCNC: 144 MG/DL
NRBC # BLD AUTO: 0 X10E3/UL
NRBC, AUTO (.00): 0 %
PLATELET # BLD AUTO: 204 K/UL (ref 150–400)
POTASSIUM SERPL-SCNC: 4.2 MMOL/L (ref 3.5–5.1)
PROT SERPL-MCNC: 7.5 G/DL (ref 6.4–8.2)
RBC # BLD AUTO: 5.3 M/UL (ref 4.2–5.4)
SODIUM SERPL-SCNC: 140 MMOL/L (ref 136–145)
TRIGL SERPL-MCNC: 196 MG/DL (ref 35–150)
VLDLC SERPL-MCNC: 39 MG/DL
WBC # BLD AUTO: 8.93 K/UL (ref 4.5–11)

## 2024-09-04 PROCEDURE — 80061 LIPID PANEL: CPT | Mod: ,,, | Performed by: CLINICAL MEDICAL LABORATORY

## 2024-09-04 PROCEDURE — 99214 OFFICE O/P EST MOD 30 MIN: CPT | Mod: ,,,

## 2024-09-04 PROCEDURE — 83036 HEMOGLOBIN GLYCOSYLATED A1C: CPT | Mod: ,,, | Performed by: CLINICAL MEDICAL LABORATORY

## 2024-09-04 PROCEDURE — 80053 COMPREHEN METABOLIC PANEL: CPT | Mod: ,,, | Performed by: CLINICAL MEDICAL LABORATORY

## 2024-09-04 PROCEDURE — 82043 UR ALBUMIN QUANTITATIVE: CPT | Mod: ,,, | Performed by: CLINICAL MEDICAL LABORATORY

## 2024-09-04 PROCEDURE — 82570 ASSAY OF URINE CREATININE: CPT | Mod: ,,, | Performed by: CLINICAL MEDICAL LABORATORY

## 2024-09-04 PROCEDURE — 3044F HG A1C LEVEL LT 7.0%: CPT | Mod: ,,,

## 2024-09-04 PROCEDURE — 85025 COMPLETE CBC W/AUTO DIFF WBC: CPT | Mod: ,,, | Performed by: CLINICAL MEDICAL LABORATORY

## 2024-09-04 PROCEDURE — 3008F BODY MASS INDEX DOCD: CPT | Mod: ,,,

## 2024-09-04 PROCEDURE — 1159F MED LIST DOCD IN RCRD: CPT | Mod: ,,,

## 2024-09-04 PROCEDURE — 1160F RVW MEDS BY RX/DR IN RCRD: CPT | Mod: ,,,

## 2024-09-04 PROCEDURE — 3078F DIAST BP <80 MM HG: CPT | Mod: ,,,

## 2024-09-04 PROCEDURE — 3074F SYST BP LT 130 MM HG: CPT | Mod: ,,,

## 2024-09-04 RX ORDER — NEBIVOLOL 20 MG/1
1 TABLET ORAL DAILY
Qty: 90 TABLET | Refills: 1 | Status: SHIPPED | OUTPATIENT
Start: 2024-09-04

## 2024-09-04 RX ORDER — PANTOPRAZOLE SODIUM 40 MG/1
40 TABLET, DELAYED RELEASE ORAL DAILY
Qty: 90 TABLET | Refills: 1 | Status: SHIPPED | OUTPATIENT
Start: 2024-09-04

## 2024-09-04 NOTE — PROGRESS NOTES
Subjective     Patient ID: Komal Busch is a 44 y.o. female.    Chief Complaint: Color Me Healthy    medication refills and lab work. She has a history of Hypertension, Vit D Deficiency, GERD, hyperlipidemia, DM 2. She has been being active at the gym and counting calories with her son. She states that they are working really hard and he is too. She has lost 6 lb since January. Encouraged her to continue life style modifications.  She denies any issues going on today. She denies any shortness of breath or chest pain      Review of Systems   Constitutional: Negative.  Negative for activity change, chills and fever.   HENT: Negative.  Negative for drooling and nosebleeds.    Eyes: Negative.    Respiratory: Negative.  Negative for chest tightness.    Cardiovascular: Negative.  Negative for chest pain and palpitations.   Gastrointestinal: Negative.    Endocrine: Negative.    Genitourinary: Negative.  Negative for difficulty urinating.   Musculoskeletal: Negative.    Integumentary:  Negative.   Allergic/Immunologic: Negative.    Neurological: Negative.  Negative for dizziness.   Hematological: Negative.    Psychiatric/Behavioral: Negative.     All other systems reviewed and are negative.      Tobacco Use: Low Risk  (9/4/2024)    Patient History     Smoking Tobacco Use: Never     Smokeless Tobacco Use: Never     Passive Exposure: Never     Review of patient's allergies indicates:   Allergen Reactions    Keflex [cephalexin]     Neosporin [neomycin-bacitracnzn-polymyxnb]     Pcn [penicillins]     Reglan [metoclopramide]      Current Outpatient Medications   Medication Instructions    aspirin (ECOTRIN) 81 mg, Daily    nebivoloL (BYSTOLIC) 20 mg, Oral, Daily    pantoprazole (PROTONIX) 40 mg, Oral, Daily    promethazine (PHENERGAN) 12.5 mg, Oral, Every 6 hours PRN    sumatriptan (IMITREX) 25 mg, Oral, Every 6 hours PRN     Medications Discontinued During This Encounter   Medication Reason    nebivoloL (BYSTOLIC) 20 mg  "Tab Reorder    pantoprazole (PROTONIX) 40 MG tablet Reorder       Past Medical History:   Diagnosis Date    Encounter for screening for COVID-19 9/29/2021    Exposure to COVID-19 virus 7/28/2021    GERD (gastroesophageal reflux disease)     Hypertension     Nonintractable headache 8/16/2021    Pleurisy 10/29/2021     Health Maintenance Topics with due status: Not Due       Topic Last Completion Date    Colorectal Cancer Screening 06/26/2023    Hemoglobin A1c (Prediabetes) 01/05/2024    Lipid Panel 01/05/2024       There is no immunization history on file for this patient.    Objective     Body mass index is 48.87 kg/m².  Wt Readings from Last 3 Encounters:   09/04/24 (!) 141.5 kg (312 lb)   05/22/24 (!) 142 kg (313 lb)   01/31/24 (!) 143.2 kg (315 lb 9.6 oz)     Ht Readings from Last 3 Encounters:   09/04/24 5' 7" (1.702 m)   05/22/24 5' 7" (1.702 m)   01/31/24 5' 7" (1.702 m)     BP Readings from Last 3 Encounters:   09/04/24 127/79   05/22/24 136/81   01/31/24 (!) 141/78     Temp Readings from Last 3 Encounters:   09/04/24 98.1 °F (36.7 °C)   05/22/24 98.3 °F (36.8 °C)   01/31/24 97.8 °F (36.6 °C) (Oral)     Pulse Readings from Last 3 Encounters:   09/04/24 63   05/22/24 78   01/31/24 65     Resp Readings from Last 3 Encounters:   09/04/24 18   05/22/24 18   01/31/24 20     PF Readings from Last 3 Encounters:   No data found for PF       Physical Exam  Constitutional:       General: She is not in acute distress.     Appearance: Normal appearance. She is well-developed. She is not ill-appearing.   HENT:      Head: Normocephalic and atraumatic.      Right Ear: Tympanic membrane normal.      Left Ear: Tympanic membrane normal.      Nose: Nose normal.      Mouth/Throat:      Mouth: Mucous membranes are moist.      Pharynx: Oropharynx is clear. No posterior oropharyngeal erythema.   Cardiovascular:      Rate and Rhythm: Normal rate and regular rhythm.      Pulses: Normal pulses.      Heart sounds: Normal heart sounds. "   Pulmonary:      Effort: Pulmonary effort is normal. No accessory muscle usage or respiratory distress.      Breath sounds: Normal breath sounds.   Abdominal:      General: Abdomen is flat. Bowel sounds are normal. There is no distension.      Palpations: Abdomen is soft.      Tenderness: There is no abdominal tenderness.   Musculoskeletal:         General: Normal range of motion.      Cervical back: Normal range of motion.   Skin:     General: Skin is warm and dry.      Capillary Refill: Capillary refill takes less than 2 seconds.   Neurological:      Mental Status: She is alert and oriented to person, place, and time. Mental status is at baseline.   Psychiatric:         Mood and Affect: Mood normal.         Speech: Speech normal.         Behavior: Behavior normal. Behavior is cooperative.         Thought Content: Thought content normal.         Assessment and Plan     Problem List Items Addressed This Visit       Hypertension     Hypertension is controlled at this time. Continue current medication, is effective at this time. Return to the clinic as needed.   -Low salt, low sodium diet, less fried foods, more baked foods, more green leafy vegetables, more fruits, less bread  -CBC, CMP today. Will call with results.   -Exercise at least 30-45 minutes a day  -Follow up in 6 months.             Relevant Medications    nebivoloL (BYSTOLIC) 20 mg Tab    Other Relevant Orders    Comprehensive Metabolic Panel    Microalbumin/Creatinine Ratio, Urine    CBC Auto Differential    Gastroesophageal reflux disease     Continue Protonix 40mg daily  -Avoid spicy, greasy foods  -Avoid caffeine, citric acid, chocolate, peppermint, and carbonated drinks  -Do not lay down within 3 hours of eating  -Exercise 150 minutes per week  -Increase fluid to 64 ounces daily  -Avoid antiinflammatory medications such as motrin, advil, aleve, ibuprofen, and BC powder           Relevant Medications    pantoprazole (PROTONIX) 40 MG tablet     Hyperlipidemia - Primary     Hyperlipidemia is controlled. Continue current medication, is effective at this time. Return to the clinic as needed.   Low fat, low chol diet, less fried foods, more baked foods, more vegetables.   Exercise daily  Take medications as ordered for hyperlipidemia  Return to the clinic as needed   Follow up in 6 months   Lipids panel today ,will call with results and adjust medications accordingly.           Relevant Orders    Microalbumin/Creatinine Ratio, Urine    Lipid Panel    Screening for diabetes mellitus (DM)    Relevant Orders    Hemoglobin A1C         Plan:       I have reviewed the medications, allergies, and problem list.     Goal Actions:    What type of visit is the patient here for today?: Color Me Healthy  Which Color Me Healthy program is the patient enrolling in?: Blood Pressure (Hypertension)  Is this a Wellness Follow Up?: No  What is your overall wellness goal? (select at least one): Improve overall health  Choose 3: Lifestyle, Nutrition, Exercise  Lifestyle Actions : Take medications as prescribed  Nurtrition Actions: adhere to a specific caloric diet  Exercise Actions: Recommend physical activity 30 minutes per day 3-5 times/week

## 2024-09-04 NOTE — PATIENT INSTRUCTIONS
"Patient Education       Type 2 Diabetes   The Basics   Written by the doctors and editors at [facility]   What is type 2 diabetes? -- Type 2 diabetes is a disorder that disrupts the way your body uses sugar. It is sometimes called type 2 diabetes mellitus.  All the cells in your body need sugar to work normally. Sugar gets into the cells with the help of a hormone called insulin. Insulin is made by the pancreas, an organ in the belly. If there is not enough insulin, or if your body stops responding to insulin, sugar builds up in the blood. That is what happens to people with diabetes.  There are two different types of diabetes:   Type 1 diabetes - In type 1 diabetes, the pancreas does not make insulin or makes very little insulin.  Type 2 diabetes - In most people with type 2 diabetes, the body stops responding to insulin normally. Then, over time, the pancreas stops making enough insulin.   Being overweight or obese increases a person's risk of developing type 2 diabetes. But people who are not overweight can get diabetes, too.  What are the symptoms of type 2 diabetes? -- Type 2 diabetes usually causes no symptoms. When symptoms do occur, they include:  Needing to urinate often  Intense thirst  Blurry vision  Can diabetes lead to other health problems? -- Yes. Type 2 diabetes might not make you feel sick. But if it is not managed, it can lead to serious problems over time, such as:  Heart attacks  Strokes  Kidney disease  Vision problems (or even blindness)  Pain or loss of feeling in the hands and feet  Needing to have fingers, toes, or other body parts removed (amputated)  How do I know if I have type 2 diabetes? -- To find out if you have type 2 diabetes, your doctor or nurse can do a blood test. There are 2 tests that can be used for this. Both involve measuring the amount of sugar in your blood, called your "blood sugar" or "blood glucose":   One of the tests measures your blood sugar at the time the blood " "sample is taken. This test is done in the morning. You can't eat or drink anything except water for at least 8 hours before the test.   The other test shows what your average blood sugar has been for the past 2 to 3 months. This blood test is called "hemoglobin A1C" or just "A1C." It can be checked at any time of the day, even if you have recently eaten.  How is type 2 diabetes treated? -- The goals of treatment are to control your blood sugar and lower the risk of future problems that can happen in people with diabetes. An important part of managing diabetes is to eat healthy foods and get plenty of physical activity.  There are a few medicines that help control blood sugar. Some people need to take pills that help the body make more insulin or that help insulin do its job. Others need insulin shots.  Depending on what medicines you take, you might need to check your blood sugar regularly at home. But not everyone with type 2 diabetes needs to do this. Your doctor or nurse will tell you if you should be checking your blood sugar, and when and how to do this.  Sometimes, people with type 2 diabetes also need medicines to reduce the problems caused by the disease. For instance, medicines used to lower blood pressure can reduce the chances of a heart attack or stroke.  It's also important to get certain vaccines, such as vaccines to protect against the flu and coronavirus disease 2019 (COVID-19). Some people also need a vaccine to prevent pneumonia.  Can type 2 diabetes be prevented? -- Yes. To lower your chances of getting type 2 diabetes, the most important thing you can do is eat a healthy diet and get plenty of physical activity. This can help you lose weight if you are overweight. But eating well and being active are also good for your overall health. Even gentle activity, like walking, has benefits.  If you smoke, quitting can also lower your risk of type 2 diabetes. Quitting smoking can be hard to do, but your " "doctor or nurse can help.  All topics are updated as new evidence becomes available and our peer review process is complete.  This topic retrieved from [employer] on: Sep 21, 2021.  Topic 06650 Version 14.0  Release: 29.4.2 - C29.263  © 2021 UpToDate, Inc. and/or its affiliates. All rights reserved.  Consumer Information Use and Disclaimer   This information is not specific medical advice and does not replace information you receive from your health care provider. This is only a brief summary of general information. It does NOT include all information about conditions, illnesses, injuries, tests, procedures, treatments, therapies, discharge instructions or life-style choices that may apply to you. You must talk with your health care provider for complete information about your health and treatment options. This information should not be used to decide whether or not to accept your health care provider's advice, instructions or recommendations. Only your health care provider has the knowledge and training to provide advice that is right for you. The use of this information is governed by the [employer] End User License Agreement, available at https://[email or web address].me/en/solutions/[employer]/about/mackenzie.The use of [employer] content is governed by the [employer] Terms of Use. ©2021 UpToDate, Inc. All rights reserved.  Copyright   © 2021 UpToDate, Inc. and/or its affiliates. All rights reserved.    Patient Education       High Blood Pressure in Adults   The Basics   Written by the doctors and editors at [facility]   What is high blood pressure? -- High blood pressure is a condition that puts you at risk for heart attack, stroke, and kidney disease. It does not usually cause symptoms. But it can be serious.  When your doctor or nurse tells you your blood pressure, they will say 2 numbers. For instance, your doctor or nurse might say that your blood pressure is "130 over 80." The top number is the pressure inside your arteries " "when your heart is shaw. The bottom number is the pressure inside your arteries when your heart is relaxed.  "Elevated blood pressure" is a term doctors or nurses use as a warning. People with elevated blood pressure do not yet have high blood pressure. But their blood pressure is not as low as it should be for good health.  Many experts define high, elevated, and normal blood pressure as follows:  High - Top number of 130 or above and/or bottom number of 80 or above  Elevated - Top number between 120 and 129 and bottom number of 79 or below  Normal - Top number of 119 or below and bottom number of 79 or below  This information is also in the table (table 1).   How can I lower my blood pressure? -- If your doctor or nurse has prescribed blood pressure medicine, the most important thing you can do is to take it. If it causes side effects, do not just stop taking it. Instead, talk to your doctor or nurse about the problems it causes. They might be able to lower your dose or switch you to another medicine. If cost is a problem, mention that too. They might be able to put you on a less expensive medicine. Taking your blood pressure medicine can keep you from having a heart attack or stroke, and it can save your life!  Can I do anything on my own? -- You have a lot of control over your blood pressure. To lower it:  Lose weight (if you are overweight)  Choose a diet low in fat and rich in fruits, vegetables, and low-fat dairy products  Reduce the amount of salt you eat  Do something active for at least 30 minutes a day on most days of the week  Cut down on alcohol (if you drink more than 2 alcoholic drinks per day)  It's also a good idea to get a home blood pressure meter. People who check their own blood pressure at home do better at keeping it low and can sometimes even reduce the amount of medicine they take.  All topics are updated as new evidence becomes available and our peer review process is complete.  This " "topic retrieved from [employer] on: Sep 21, 2021.  Topic 41080 Version 15.0  Release: 29.4.2 - C29.263  © 2021 UpToDate, Inc. and/or its affiliates. All rights reserved.  table 1: Definition of normal and high blood pressure  Level  Top number  Bottom number    High 130 or above 80 or above   Elevated 120 to 129 79 or below   Normal 119 or below 79 or below   These definitions are from the American College of Cardiology/American Heart Association. Other expert groups might use slightly different definitions.  "Elevated blood pressure" is a term doctor or nurses use as a warning. It means you do not yet have high blood pressure, but your blood pressure is not as low as it should be for good health.  Graphic 91484 Version 6.0  Consumer Information Use and Disclaimer   This information is not specific medical advice and does not replace information you receive from your health care provider. This is only a brief summary of general information. It does NOT include all information about conditions, illnesses, injuries, tests, procedures, treatments, therapies, discharge instructions or life-style choices that may apply to you. You must talk with your health care provider for complete information about your health and treatment options. This information should not be used to decide whether or not to accept your health care provider's advice, instructions or recommendations. Only your health care provider has the knowledge and training to provide advice that is right for you. The use of this information is governed by the [employer] End User License Agreement, available at https://[email or web address] Networks/en/solutions/P[employer]/about/mackenzie.The use of [employer] content is governed by the [employer] Terms of Use. ©2021 UpToDate, Inc. All rights reserved.  Copyright   © 2021 UpToDate, Inc. and/or its affiliates. All rights reserved.    Patient Education       High Cholesterol   The Basics   Written by the doctors and editors at [employer] " "  What is cholesterol? -- Cholesterol is a substance that is found in the blood. Everyone has some. It is needed for good health. The problem is, people sometimes have too much cholesterol. Compared with people with normal cholesterol, people with high cholesterol have a higher risk of heart attacks, strokes, and other health problems. The higher your cholesterol, the higher your risk of these problems.  Are there different types of cholesterol? -- Yes, there are a few different types. If you get a cholesterol test, you might hear your doctor or nurse talk about:  Total cholesterol  LDL cholesterol - Some people call this the "bad" cholesterol. That's because having high LDL levels raises your risk of heart attacks, strokes, and other health problems.  HDL cholesterol - Some people call this the "good" cholesterol. That's because people with high HDL levels tend to have a lower risk of heart attacks, strokes, and other health problems.   Non-HDL cholesterol - Non-HDL cholesterol is your total cholesterol minus your HDL cholesterol.  Triglycerides - Triglycerides are not cholesterol. They are another type of fat. But they often get measured when cholesterol is measured. (Having high triglycerides also seems to increase the risk of heart attacks and strokes.)  What should my numbers be? -- Ask your doctor or nurse what your numbers should be. Different people need different goals. (If you live outside the [place], see the table (table 1)). In general, people who do not already have heart disease should aim for:  Total cholesterol below 200  LDL cholesterol below 130 - or much lower, if they are at risk of heart attacks or strokes  HDL cholesterol above 60  Non-HDL cholesterol below 160 - or lower, if they are at risk of heart attacks or strokes  Triglycerides below 150  Keep in mind, though, that many people who cannot meet these goals still have a low risk of heart attacks and strokes.  What should I do if my " "doctor tells me I have high cholesterol? -- Ask your doctor what your overall risk of heart attacks and strokes is. High cholesterol, by itself, is not always a reason to worry. Having high cholesterol is just one of many things that can increase your risk of heart attacks and strokes. Other factors that increase your risk include:  Cigarette smoking  High blood pressure  Having a parent, sister, or brother who got heart disease at a young age - Young, in this case, means younger than 55 for men and younger than 65 for women.  A diet that is not heart healthy - A "heart-healthy" diet includes lots of fruits and vegetables, fiber, and healthy fats (like those found in fish and certain oils). It also means limiting sugar and unhealthy fats.  Older age  If you are at high risk of heart attacks and strokes, having high cholesterol is a problem. On the other hand, if you are at low risk, having high cholesterol might not lead to treatment.  Should I take medicine to lower cholesterol? -- Not everyone who has high cholesterol needs medicines. Your doctor or nurse will decide if you need them based on your age, family history, and other health concerns.  You should probably take a cholesterol-lowering medicine called a statin if you:  Already had a heart attack or stroke  Have known heart disease  Have diabetes  Have a condition called peripheral artery disease, which makes it painful to walk, and happens when the arteries in your legs get clogged with fatty deposits  Have an abdominal aortic aneurysm, which is a widening of the main artery in the belly  Most people with any of the conditions listed above should take a statin no matter what their cholesterol level is. If your doctor or nurse puts you on a statin, stay on it. The medicine might not make you feel any different. But it can help prevent heart attacks, strokes, and death.  Can I lower my cholesterol without medicines? -- Yes, you can lower your cholesterol some " by:  Avoiding red meat, butter, fried foods, cheese, and other foods that have a lot of saturated fat  Losing weight (if you are overweight)  Being more active  Even if these steps do little to change your cholesterol, they can improve your health in many ways.  All topics are updated as new evidence becomes available and our peer review process is complete.  This topic retrieved from [employer] on: Sep 21, 2021.  Topic 56643 Version 19.0  Release: 29.4.2 - C29.263  © 2021 UpToDate, Inc. and/or its affiliates. All rights reserved.  table 1: Cholesterol and triglyceride measurements in the [place] and elsewhere     Measurement used within the [place] Milligrams/deciliter (mg/dL)  Measurement used most places outside the [place] Millimoles/liter (mmol/Liter)     Level to aim for  Level to aim for    Total cholesterol  Below 200 Below 5.17   LDL cholesterol  Below 130 - or much lower if at risk of heart attack and stroke Below 3.36 - or much lower if at risk of heart attack and stroke   HDL cholesterol  Above 60 Above 1.55   Triglycerides  Below 150 Below 1.7   Cholesterol is measured differently in the [place] than it is in most other countries. This table shows values used within and outside the [place]. It includes the cholesterol and triglyceride levels that most people who do not have heart disease should aim for.  Graphic 85466 Version 3.0  Consumer Information Use and Disclaimer   This information is not specific medical advice and does not replace information you receive from your health care provider. This is only a brief summary of general information. It does NOT include all information about conditions, illnesses, injuries, tests, procedures, treatments, therapies, discharge instructions or life-style choices that may apply to you. You must talk with your health care provider for complete information about your health and treatment options. This information should not be used to  decide whether or not to accept your health care provider's advice, instructions or recommendations. Only your health care provider has the knowledge and training to provide advice that is right for you. The use of this information is governed by the [employer] End User License Agreement, available at https://[email or web address]¡cil/en/solutions/[employer]/about/mackenzie.The use of [employer] content is governed by the [employer] Terms of Use. ©2021 UpToDate, Inc. All rights reserved.  Copyright   © 2021 UpToDate, Inc. and/or its affiliates. All rights reserved.    Patient Education       Diet and Health   The Basics   Written by the doctors and editors at [facility]   Why is it important to eat a healthy diet? -- It's important to eat a healthy diet because eating the right foods can keep you healthy now and later on in life.  Which foods are especially healthy? -- Foods that are especially healthy include:  Fruits and vegetables - Eating a diet with lots of fruits and vegetables can help prevent heart disease and strokes. It might also help prevent certain types of cancers. Try to eat fruits and vegetables at each meal and also for snacks. If you don't have fresh fruits and vegetables available, you can eat frozen or canned ones instead. Doctors recommend eating at least 2 1/2 servings of vegetables and 2 servings of fruits each day.  Foods with fiber - Eating foods with a lot of fiber can help prevent heart disease and strokes. If you have type 2 diabetes, it can also help control your blood sugar. Foods that have a lot of fiber include vegetables, fruits, beans, nuts, oatmeal, and whole grain breads and cereals. You can tell how much fiber is in a food by reading the nutrition label (figure 1). Doctors recommend eating 25 to 36 grams of fiber each day.  Foods with folate (also called folic acid) - Folate is a vitamin that is important for pregnant people, since it helps prevent certain birth defects. Anyone who could get  "pregnant should get at least 400 micrograms of folic acid daily, whether or not they are actively trying to get pregnant. Folate is found in many breakfast cereals, oranges, orange juice, and green leafy vegetables.  Foods with calcium and vitamin D - Babies, children, and adults need calcium and vitamin D to help keep their bones strong. Adults also need calcium and vitamin D to help prevent osteoporosis. Osteoporosis is a condition that causes bones to get "thin" and break more easily than usual. Different foods and drinks have calcium and vitamin D in them (figure 2). People who don't get enough calcium and vitamin D in their diet might need to take a supplement.  Foods with protein - Protein helps your muscles stay strong. Healthy foods with a lot of protein include chicken, fish, eggs, beans, nuts, and soy products. Red meat also has a lot of protein, but it also contains fats, which can be unhealthy.  Some experts recommend a "Mediterranean diet." This involves eating a lot of fruits, vegetables, nuts, whole grains, and olive oil. It also includes some fish, poultry, and dairy products, but not a lot of red meat. Eating this way can help your overall health, and might even lower your risk of having a stroke.  What foods should I avoid or limit? -- To eat a healthy diet, there are some things you should avoid or limit. They include:   Fats - There are different types of fats. Some types of fats are better for your body than others.  Trans fats are especially unhealthy. They are found in margarines, many fast foods, and some store-bought baked goods. Trans fats can raise your cholesterol level and your increase your chance of getting heart disease. You should avoid eating foods with these types of fats.  The type of "polyunsaturated" fats found in fish seems to be healthy and can reduce your chance of getting heart disease. Other polyunsaturated fats might also be good for your health. When you cook, it's best " "to use oils with healthier fats, such as olive oil and canola oil.  Sugar - To have a healthy diet, it's important to limit or avoid sugar, sweets, and refined grains. Refined grains are found in white bread, white rice, most forms of pasta, and most packaged "snack" foods. Whole grains, such as whole-wheat bread and brown rice, have more fiber and are better for your health.  Avoiding sugar-sweetened beverages, like soda and sports drinks, can also help improve your health.  Red meat - Studies have shown that eating a lot of red meat can increase your risk of certain health problems, including heart disease and cancer. You should limit the amount of red meat that you eat.  Can I drink alcohol as part of a healthy diet? -- People who drink a small amount of alcohol each day might have a lower chance of getting heart disease. But drinking alcohol can lead to problems. For example, it can raise a person's chances of getting liver disease and certain types of cancers. In women, even 1 drink a day can increase the risk of getting breast cancer.  Most doctors recommend that adult women not have more than 1 drink a day and that adult men not have more than 2 drinks a day. The limits are different because most women's bodies take longer to break down alcohol.  How many calories do I need each day? -- The number of calories you need each day depends on your weight, height, age, sex, and how active you are.  Your doctor or nurse can tell you how many calories you should eat each day. If you are trying to lose weight, you should eat fewer calories each day.  What if I have questions? -- If you have questions about which foods you should or should not eat, ask your doctor or nurse. The right diet for you will depend, in part, on your health and any medical conditions you have.  All topics are updated as new evidence becomes available and our peer review process is complete.  This topic retrieved from [employer] on: Sep 21, " "2021.  Topic 58457 Version 20.0  Release: 29.4.2 - C29.263  © 2021 UpToDate, Inc. and/or its affiliates. All rights reserved.  figure 1: Nutrition label - fiber     This is an example of a nutrition label. To figure out how much fiber is in a food, look for the line that says "Dietary Fiber." It's also important to look at the serving size. This food has 7 grams of fiber in each serving, and each serving is 1 cup.  Graphic 09766 Version 7.0    figure 2: Foods and drinks with calcium and vitamin D     Foods rich in calcium include ice cream, soy milk, breads, kale, broccoli, milk, cheese, cottage cheese, almonds, yogurt, ready-to-eat cereals, beans, and tofu. Foods rich in vitamin D include milk, fortified plant-based "milks" (soy, almond), canned tuna fish, cod liver oil, yogurt, ready-to-eat-cereals, cooked salmon, canned sardines, mackerel, and eggs. Some of these foods are rich in both.  Graphic 23021 Version 4.0    Consumer Information Use and Disclaimer   This information is not specific medical advice and does not replace information you receive from your health care provider. This is only a brief summary of general information. It does NOT include all information about conditions, illnesses, injuries, tests, procedures, treatments, therapies, discharge instructions or life-style choices that may apply to you. You must talk with your health care provider for complete information about your health and treatment options. This information should not be used to decide whether or not to accept your health care provider's advice, instructions or recommendations. Only your health care provider has the knowledge and training to provide advice that is right for you. The use of this information is governed by the [employer] End User License Agreement, available at https://[email or web address] [email or web address]/en/solutions/[employer]/about/mackenzie.The use of [employer] content is governed by the [employer] Terms of Use. ©2021 UpToDate, Inc. All " "rights reserved.  Copyright   © 2021 UpToDate, Inc. and/or its affiliates. All rights reserved.    Patient Education       Diabetes and Diet   The Basics   Written by the doctors and editors at [employer]   Why is diet important in diabetes? -- Diet is important because it is part of diabetes treatment. Many people need to change what they eat and how much they eat to help treat their diabetes. It is important for people to treat their diabetes so that they:  Keep their blood sugar at or near a normal level  Prevent long-term problems, such as heart or kidney problems, that can happen in people with diabetes  Changing your diet can also help treat obesity, high blood pressure, and high cholesterol. These conditions can affect people with diabetes and can lead to future problems, such as heart attacks or strokes.  Who will work with me to change my diet? -- Your doctor or nurse will work with you to make a food plan to change your diet. They might also recommend that you work with a "dietitian." A dietitian is an expert on food and eating.  Do I need to eat at the same times every day? -- When and how often you should eat depends, in part, on the diabetes medicines you take. For example:  People who take about the same amount of insulin at the same time each day (called a "fixed regimen") should eat meals at the same times. This is also true for people who take pills that increase insulin levels, such as sulfonylureas. Eating meals at the same time every day helps prevent low blood sugar.  People who adjust the dose and timing of their insulin each day (called a "flexible regimen") do not always have to eat meals at the same time. That's because they can time their insulin dose for before they plan to eat, and also adjust the dose for how much they plan to eat.  People who take medicines that don't usually cause low blood sugar, such as metformin, don't have to eat meals at the same time every day.  What do I need to " "think about when planning what to eat? -- Our bodies break down the food we eat into small pieces called carbohydrates, proteins, and fats.  When planning what to eat, people with diabetes need to think about:  Carbohydrates (or "carbs") - Carbohydrates, which are sugars that our bodies use for energy, can raise a person's blood sugar level. Your doctor, nurse, or dietitian will tell you how many carbohydrates you should eat at each meal or snack. Foods that have carbohydrates include:  Bread, pasta, and rice  Vegetables and fruits  Dairy foods  Foods and drinks with added sugar  It is best to get your carbohydrates from fruits, vegetables, whole grains, and low-fat milk. It is best to avoid drinks with added sugar, like soda, juices, and sports drinks.   Protein - Your doctor, nurse, or dietitian will tell you how much protein you should eat each day. It is best to eat lean meats, fish, eggs, beans, peas, soy products, nuts, and seeds.  Fats - The type of fat you eat is more important than the amount of fat. "Saturated" and "trans" fats can increase your risk for heart problems, like a heart attack.  Foods that have saturated fats include meat, butter, cheese, and ice cream.  Foods that have trans fats include processed food with "partially hydrogenated oils" on the ingredient list. This may include fried foods, store bought cookies, muffins, pies, and cakes.  "Monounsaturated" and "polyunsaturated" fats are better for you. Foods with these types of fat include fish, avocado, olive oil, and nuts.  Calories - People need to eat a certain amount of calories each day to keep their weight the same. People who are overweight and want to lose weight need to eat fewer calories each day.  Fiber - Eating foods with a lot of fiber can help control a person's blood sugar level. Foods that have a lot of fiber include apples, green beans, peas, beans, lentils, nuts, oatmeal, and whole grains.  Salt - People who have high blood " pressure should not eat foods that contain a lot of salt (also called sodium). People with high blood pressure should also eat healthy foods, such as fruits, vegetables, and low-fat dairy foods.  Alcohol - Having more than 1 drink (for women) or 2 drinks (for men) a day can raise blood sugar levels. Also, drinks that have fruit juice or soda in them can raise blood sugar levels.  What can I do if I need to lose weight? -- If you need to lose weight, you can:  Exercise - Try to get at least 30 minutes of physical activity a day, most days of the week. Even gentle exercise, like walking, is good for your health. Some people with diabetes need to change their medicine dose before they exercise. They might also need to check their blood sugar levels before and after exercising.  Eat fewer calories - Your doctor, nurse, or dietitian can tell you how many calories you should eat each day in order to lose weight.  If you are worried about your weight, size, or shape, talk with your doctor, nurse, or dietitian. They can help you make changes to improve your health.  Can I eat the same foods as my family? -- Yes. You do not need to eat special foods if you have diabetes. You and your family can eat the same foods. Changing your diet is mostly about eating healthy foods and not eating too much.  What are the other parts of diabetes treatment? -- Besides changing your diet, the other parts of diabetes treatment are:  Exercise  Medicines  Some people with diabetes need to learn how to match their diet and exercise with their medicine dose. For example, people who use insulin might need to choose the dose of insulin they give themselves. To choose their dose, they need to think about:  What they plan to eat at the next meal  How much exercise they plan to do  What their blood sugar level is  If the diet and exercise do not match the medicine dose, a person's blood sugar level can get too low or too high. Blood sugar levels that  are too low or too high can cause problems.  All topics are updated as new evidence becomes available and our peer review process is complete.  This topic retrieved from [employer] on: Sep 21, 2021.  Topic 86093 Version 7.0  Release: 29.4.2 - C29.263  © 2021 UpToDate, Inc. and/or its affiliates. All rights reserved.  Consumer Information Use and Disclaimer   This information is not specific medical advice and does not replace information you receive from your health care provider. This is only a brief summary of general information. It does NOT include all information about conditions, illnesses, injuries, tests, procedures, treatments, therapies, discharge instructions or life-style choices that may apply to you. You must talk with your health care provider for complete information about your health and treatment options. This information should not be used to decide whether or not to accept your health care provider's advice, instructions or recommendations. Only your health care provider has the knowledge and training to provide advice that is right for you. The use of this information is governed by the [employer] End User License Agreement, available at https://www.[employer]/en/solutions/[employer]/about/mackenzie.The use of [employer] content is governed by the [employer] Terms of Use. ©2021 UpToDate, Inc. All rights reserved.  Copyright   © 2021 UpToDate, Inc. and/or its affiliates. All rights reserved.

## 2024-09-04 NOTE — ASSESSMENT & PLAN NOTE
Diabetes is controlled at this time. Continue current medication, is effective at this time. Return to the clinic as needed.   -Blood sugar is controlled at this time, will get an A1C today.   -Work on diet, specifically cutting back on bread, breaded things, cereal, sugars, sugary drinks, cakes etc.   -Add free weights if you can even light weights will help.   -Keep sugars, fasting sugar goal is , after eating no greater than 180.   -A1C goal is 7.0% or less.

## 2024-09-04 NOTE — ASSESSMENT & PLAN NOTE
Hypertension is controlled at this time. Continue current medication, is effective at this time. Return to the clinic as needed.   -Low salt, low sodium diet, less fried foods, more baked foods, more green leafy vegetables, more fruits, less bread  -CBC, CMP today. Will call with results.   -Exercise at least 30-45 minutes a day  -Follow up in 6 months.

## 2024-09-05 NOTE — PROGRESS NOTES
Labs look good. Triglycerides are a little elevated. Eat less fatty good. Follow up in 6 months. A1c is 5.7 so it has come down from 5.9, the dieting is helping overall good job!

## 2024-10-16 ENCOUNTER — PATIENT OUTREACH (OUTPATIENT)
Facility: HOSPITAL | Age: 44
End: 2024-10-16
Payer: COMMERCIAL

## 2024-10-16 NOTE — PROGRESS NOTES
Population Health Chart Review & Patient Outreach Details    Per [facility] website, insurance is active and patient is enrolled in CM:  CM! Provider CM! Benefit Start Date CMH! Benefit End Date Baseline Risk Track(s) Baseline Risk Level Current Risk Tracks(s) Current Risk Level   SHANKARSUSAN MARCUMKARINA RAJAN 2024 Hypertension, Glucose, Cholesterol Low Hypertension, Glucose, Cholesterol Low               The Color Me Healthy! biometrics entry should be used only for biometrics associated with Color Me Healthy! services.     Color Me Healthy! biometrics must be submitted by a [employer] Provider. Please enter the [employer] Provider's NPI to submit biometrics.  Provider Search   Performing Provider NPI:               The services below are available to the member under Color Me Healthy! when performed by a [employer] Provider.  Benefit accumulators are based on Color Me Healthy! benefit periods.  Color Me Healthy! Services Guide   Color Me Healthy! Services  CPT Codes     A1C  (0 of 2 Completed) View CPT Codes »    Lipid Profile  (0 of 2 Completed) View CPT Codes »    Metabolic Visit  (1 of 2 Completed) View CPT Codes »    Microalbumin  (0 of 1 Completed) View CPT Codes »    Medication Monitoring-Renal  (0 of 2 Completed) View CPT Codes »    Medication Monitoring-Liver  (0 of 2 Completed) View CPT Codes »    Dilated Eye Exam  (0 of 1 Completed) View CPT Codes »    Venipuncture  (0 of 2 Completed) View CPT Codes »           Further Action Needed If Patient Returns Outreach:            Updates Requested / Reviewed:     []  Care Everywhere    []     []  External Sources ([facility], [facility], DIS, etc.)    [] [facility]   [] [facility]   [] Other:    []  Care Team Updated   []  Removed  or Duplicate Orders   []  Immunization Reconciliation Completed / Queried    [] [place]   [] [place]   [] [place]   [] [place]      Health Maintenance Topics Addressed and Outreach  Outcomes / Actions Taken:             Breast Cancer Screening []  Mammogram Order Placed    []  Mammogram Screening Scheduled    []  External Records Requested & Care Team Updated if Applicable    []  External Records Uploaded & Care Team Updated if Applicable    []  Pt Declined Scheduling Mammogram    []  Pt Will Schedule with External Provider / Order Routed & Care Team Updated if Applicable              Cervical Cancer Screening []  Pap Smear Scheduled in Primary Care or OBGYN    []  External Records Requested & Care Team Updated if Applicable       []  External Records Uploaded, Care Team Updated, & History Updated if Applicable    []  Patient Declined Scheduling Pap Smear    []  Patient Will Schedule with External Provider & Care Team Updated if Applicable                  Colorectal Cancer Screening []  Colonoscopy Case Request / Referral / Home Test Order Placed    []  External Records Requested & Care Team Updated if Applicable    []  External Records Uploaded, Care Team Updated, & History Updated if Applicable    []  Patient Declined Completing Colon Cancer Screening    []  Patient Will Schedule with External Provider & Care Team Updated if Applicable    []  Fit Kit Mailed (add the SmartPhrase under additional notes)    []  Reminded Patient to Complete Home Test                Diabetic Eye Exam []  Eye Exam Screening Order Placed    []  Eye Camera Scheduled or Optometry/Ophthalmology Referral Placed    []  External Records Requested & Care Team Updated if Applicable    []  External Records Uploaded, Care Team Updated, & History Updated if Applicable    []  Patient Declined Scheduling Eye Exam    []  Patient Will Schedule with External Provider & Care Team Updated if Applicable             Blood Pressure Control []  Primary Care Follow Up Visit Scheduled     []  Remote Blood Pressure Reading Captured    []  Patient Declined Remote Reading or Scheduling Appt - Escalated to PCP    []  Patient Will Call Back or  Send Portal Message with Reading                 HbA1c & Other Labs []  Overdue Lab(s) Ordered    []  Overdue Lab(s) Scheduled    []  External Records Uploaded & Care Team Updated if Applicable    []  Primary Care Follow Up Visit Scheduled     []  Reminded Patient to Complete A1c Home Test    []  Patient Declined Scheduling Labs or Will Call Back to Schedule    []  Patient Will Schedule with External Provider / Order Routed, & Care Team Updated if Applicable           Primary Care Appointment []  Primary Care Appt Scheduled    []  Patient Declined Scheduling or Will Call Back to Schedule    []  Pt Established with External Provider, Updated Care Team, & Informed Pt to Notify Payor if Applicable           Medication Adherence /    Statin Use []  Primary Care Appointment Scheduled    []  Patient Reminded to  Prescription    []  Patient Declined, Provider Notified if Needed    []  Sent Provider Message to Review to Evaluate Pt for Statin, Add Exclusion Dx Codes, Document   Exclusion in Problem List, Change Statin Intensity Level to Moderate or High Intensity if Applicable                Osteoporosis Screening []  Dexa Order Placed    []  Dexa Appointment Scheduled    []  External Records Requested & Care Team Updated    []  External Records Uploaded, Care Team Updated, & History Updated if Applicable    []  Patient Declined Scheduling Dexa or Will Call Back to Schedule    []  Patient Will Schedule with External Provider / Order Routed & Care Team Updated if Applicable       Additional Notes:

## 2024-10-21 ENCOUNTER — HOSPITAL ENCOUNTER (OUTPATIENT)
Dept: RADIOLOGY | Facility: HOSPITAL | Age: 44
Discharge: HOME | End: 2024-10-21
Payer: COMMERCIAL

## 2024-10-21 VITALS — HEIGHT: 68 IN | BODY MASS INDEX: 44.41 KG/M2 | WEIGHT: 293 LBS

## 2024-10-21 DIAGNOSIS — Z12.31 OTHER SCREENING MAMMOGRAM: ICD-10-CM

## 2024-10-21 PROCEDURE — 77067 SCR MAMMO BI INCL CAD: CPT | Mod: 26,,, | Performed by: RADIOLOGY

## 2024-10-21 PROCEDURE — 77063 BREAST TOMOSYNTHESIS BI: CPT | Mod: 26,,, | Performed by: RADIOLOGY

## 2024-10-21 PROCEDURE — 77067 SCR MAMMO BI INCL CAD: CPT | Mod: TC

## 2024-12-05 DIAGNOSIS — R13.19 ESOPHAGEAL DYSPHAGIA: Primary | ICD-10-CM

## 2024-12-10 ENCOUNTER — ANESTHESIA (OUTPATIENT)
Dept: GASTROENTEROLOGY | Facility: HOSPITAL | Age: 44
End: 2024-12-10
Payer: COMMERCIAL

## 2024-12-10 ENCOUNTER — HOSPITAL ENCOUNTER (OUTPATIENT)
Dept: GASTROENTEROLOGY | Facility: HOSPITAL | Age: 44
Discharge: HOME | End: 2024-12-10
Attending: INTERNAL MEDICINE | Admitting: INTERNAL MEDICINE
Payer: COMMERCIAL

## 2024-12-10 ENCOUNTER — ANESTHESIA EVENT (OUTPATIENT)
Dept: GASTROENTEROLOGY | Facility: HOSPITAL | Age: 44
End: 2024-12-10
Payer: COMMERCIAL

## 2024-12-10 VITALS
HEART RATE: 63 BPM | WEIGHT: 293 LBS | TEMPERATURE: 97 F | RESPIRATION RATE: 13 BRPM | BODY MASS INDEX: 44.41 KG/M2 | DIASTOLIC BLOOD PRESSURE: 46 MMHG | HEIGHT: 68 IN | OXYGEN SATURATION: 99 % | SYSTOLIC BLOOD PRESSURE: 101 MMHG

## 2024-12-10 DIAGNOSIS — K29.00 ACUTE SUPERFICIAL GASTRITIS WITHOUT HEMORRHAGE: Primary | ICD-10-CM

## 2024-12-10 DIAGNOSIS — R13.19 ESOPHAGEAL DYSPHAGIA: ICD-10-CM

## 2024-12-10 DIAGNOSIS — K21.9 GASTROESOPHAGEAL REFLUX DISEASE WITHOUT ESOPHAGITIS: ICD-10-CM

## 2024-12-10 PROCEDURE — 43450 DILATE ESOPHAGUS 1/MULT PASS: CPT | Mod: 51,,, | Performed by: INTERNAL MEDICINE

## 2024-12-10 PROCEDURE — 43239 EGD BIOPSY SINGLE/MULTIPLE: CPT | Mod: ,,, | Performed by: INTERNAL MEDICINE

## 2024-12-10 PROCEDURE — 88342 IMHCHEM/IMCYTCHM 1ST ANTB: CPT | Mod: 26,,, | Performed by: PATHOLOGY

## 2024-12-10 PROCEDURE — 63600175 PHARM REV CODE 636 W HCPCS: Performed by: NURSE ANESTHETIST, CERTIFIED REGISTERED

## 2024-12-10 PROCEDURE — 88305 TISSUE EXAM BY PATHOLOGIST: CPT | Mod: TC,SUR | Performed by: INTERNAL MEDICINE

## 2024-12-10 PROCEDURE — 37000009 HC ANESTHESIA EA ADD 15 MINS

## 2024-12-10 PROCEDURE — 43450 DILATE ESOPHAGUS 1/MULT PASS: CPT | Performed by: INTERNAL MEDICINE

## 2024-12-10 PROCEDURE — 27201423 OPTIME MED/SURG SUP & DEVICES STERILE SUPPLY

## 2024-12-10 PROCEDURE — 37000008 HC ANESTHESIA 1ST 15 MINUTES

## 2024-12-10 PROCEDURE — 27000284 HC CANNULA NASAL: Performed by: NURSE ANESTHETIST, CERTIFIED REGISTERED

## 2024-12-10 PROCEDURE — 43239 EGD BIOPSY SINGLE/MULTIPLE: CPT | Performed by: INTERNAL MEDICINE

## 2024-12-10 PROCEDURE — 88305 TISSUE EXAM BY PATHOLOGIST: CPT | Mod: 26,,, | Performed by: PATHOLOGY

## 2024-12-10 RX ORDER — SODIUM CHLORIDE, SODIUM LACTATE, POTASSIUM CHLORIDE, CALCIUM CHLORIDE 600; 310; 30; 20 MG/100ML; MG/100ML; MG/100ML; MG/100ML
INJECTION, SOLUTION INTRAVENOUS CONTINUOUS
Status: DISCONTINUED | OUTPATIENT
Start: 2024-12-10 | End: 2024-12-11 | Stop reason: HOSPADM

## 2024-12-10 RX ORDER — FENTANYL CITRATE 50 UG/ML
INJECTION, SOLUTION INTRAMUSCULAR; INTRAVENOUS
Status: DISCONTINUED | OUTPATIENT
Start: 2024-12-10 | End: 2024-12-10

## 2024-12-10 RX ORDER — PROPOFOL 10 MG/ML
VIAL (ML) INTRAVENOUS
Status: DISCONTINUED | OUTPATIENT
Start: 2024-12-10 | End: 2024-12-10

## 2024-12-10 RX ORDER — LIDOCAINE HYDROCHLORIDE 20 MG/ML
INJECTION, SOLUTION EPIDURAL; INFILTRATION; INTRACAUDAL; PERINEURAL
Status: DISCONTINUED | OUTPATIENT
Start: 2024-12-10 | End: 2024-12-10

## 2024-12-10 RX ORDER — SODIUM CHLORIDE 0.9 % (FLUSH) 0.9 %
10 SYRINGE (ML) INJECTION EVERY 6 HOURS PRN
Status: DISCONTINUED | OUTPATIENT
Start: 2024-12-10 | End: 2024-12-11 | Stop reason: HOSPADM

## 2024-12-10 RX ADMIN — LIDOCAINE HYDROCHLORIDE 100 MG: 20 INJECTION, SOLUTION INTRAVENOUS at 08:12

## 2024-12-10 RX ADMIN — FENTANYL CITRATE 100 MCG: 50 INJECTION INTRAMUSCULAR; INTRAVENOUS at 08:12

## 2024-12-10 RX ADMIN — PROPOFOL 50 MG: 10 INJECTION, EMULSION INTRAVENOUS at 09:12

## 2024-12-10 RX ADMIN — PROPOFOL 50 MG: 10 INJECTION, EMULSION INTRAVENOUS at 08:12

## 2024-12-10 NOTE — ANESTHESIA PREPROCEDURE EVALUATION
12/10/2024  Komal Busch is a 44 y.o., female.    Social History     Socioeconomic History    Marital status:    Tobacco Use    Smoking status: Never     Passive exposure: Never    Smokeless tobacco: Never   Substance and Sexual Activity    Alcohol use: Never    Drug use: Never    Sexual activity: Yes      Pre-op Assessment    I have reviewed the Patient Summary Reports.     I have reviewed the Nursing Notes. I have reviewed the NPO Status.   I have reviewed the Medications.     Review of Systems  Anesthesia Hx:  No problems with previous Anesthesia                Cardiovascular:     Hypertension                                          Hepatic/GI:     GERD                Neurological:      Headaches                                 Endocrine:  Diabetes             Past Medical History:   Diagnosis Date    Encounter for screening for COVID-19 9/29/2021    Exposure to COVID-19 virus 7/28/2021    GERD (gastroesophageal reflux disease)     Hypertension     Nonintractable headache 8/16/2021    Pleurisy 10/29/2021      Past Surgical History:   Procedure Laterality Date    CHOLECYSTECTOMY      HYSTERECTOMY      KNEE SURGERY Left         Physical Exam  General: Well nourished, Cooperative, Alert and Oriented    Airway:  Mallampati: II     Chest/Lungs:  Clear to auscultation, Normal Respiratory Rate    Heart:  Rate: Normal        Anesthesia Plan  Type of Anesthesia, risks & benefits discussed:    Anesthesia Type: Gen Natural Airway, MAC  Intra-op Monitoring Plan: Standard ASA Monitors  Post Op Pain Control Plan: multimodal analgesia and IV/PO Opioids PRN  Induction:  IV  Informed Consent: Informed consent signed with the Patient and all parties understand the risks and agree with anesthesia plan.  All questions answered. Patient consented to blood products? Yes  ASA Score: 2  Day of Surgery Review of  History & Physical: I have interviewed and examined the patient. I have reviewed the patient's H&P dated: There are no significant changes.     Ready For Surgery From Anesthesia Perspective.     .

## 2024-12-10 NOTE — H&P
[facility] - Endoscopy  Gastroenterology  H&P    Patient Name: Komal Busch  MRN: [id number]  Admission Date: 12/10/2024  Code Status: No Order    Attending Provider: Junior Del Real MD   Primary Care Physician: Ally Miller FNP  Principal Problem:<principal problem not specified>    Subjective:     History of Present Illness:  This patient is a 44-year-old female with chronic GERD.  She is on PPI therapy.  She does have nausea.  She is scheduled for EGD today.  She does complain of esophageal dysphagia.  Past Medical History:   Diagnosis Date    Encounter for screening for COVID-19 9/29/2021    Exposure to COVID-19 virus 7/28/2021    GERD (gastroesophageal reflux disease)     Hypertension     Nonintractable headache 8/16/2021    Pleurisy 10/29/2021       Past Surgical History:   Procedure Laterality Date    CHOLECYSTECTOMY      HYSTERECTOMY      KNEE SURGERY Left        Review of patient's allergies indicates:   Allergen Reactions    Keflex [cephalexin]     Neosporin [neomycin-bacitracnzn-polymyxnb]     Pcn [penicillins]     Reglan [metoclopramide]      Family History       Problem Relation (Age of Onset)    Asthma Mother    COPD Mother    Diabetes Father, Sister    Heart attack Father    Heart attacks under age 50 Brother    Heart disease Father, Sister, Maternal Grandfather    Hypertension Father    Kidney disease Maternal Grandmother    Miscarriages / Stillbirths Maternal Grandmother    Skin cancer Maternal Grandmother          Tobacco Use    Smoking status: Never     Passive exposure: Never    Smokeless tobacco: Never   Substance and Sexual Activity    Alcohol use: Never    Drug use: Never    Sexual activity: Yes     Review of Systems   Respiratory:  Positive for choking.    Cardiovascular: Negative.    Gastrointestinal:  Positive for nausea.     Objective:     Vital Signs (Most Recent):  Temp: 97.8 °F (36.6 °C) (12/10/24 0758)  Pulse: 66 (12/10/24 0758)  Resp: 13 (12/10/24 0758)  BP: 122/75  "(12/10/24 0758)  SpO2: 98 % (12/10/24 0758) Vital Signs (24h Range):  Temp:  [97.8 °F (36.6 °C)] 97.8 °F (36.6 °C)  Pulse:  [66] 66  Resp:  [13] 13  SpO2:  [98 %] 98 %  BP: (122)/(75) 122/75     Weight: 134.7 kg (297 lb) (12/10/24 0758)  Body mass index is 45.16 kg/m².    No intake or output data in the 24 hours ending 12/10/24 0845    Lines/Drains/Airways       Peripheral Intravenous Line  Duration                  Peripheral IV - Single Lumen 12/10/24 0809 22 G Left;Posterior Hand <1 day                    Physical Exam  Constitutional:       General: She is not in acute distress.     Appearance: Normal appearance. She is well-developed. She is obese. She is not ill-appearing.   HENT:      Head: Normocephalic and atraumatic.      Nose: Nose normal.   Eyes:      Pupils: Pupils are equal, round, and reactive to light.   Cardiovascular:      Rate and Rhythm: Normal rate and regular rhythm.   Pulmonary:      Effort: Pulmonary effort is normal.      Breath sounds: Normal breath sounds. No wheezing.   Abdominal:      General: Abdomen is flat. Bowel sounds are normal. There is no distension.      Palpations: Abdomen is soft.      Tenderness: There is no abdominal tenderness. There is no guarding.   Skin:     General: Skin is warm and dry.      Coloration: Skin is not jaundiced.   Neurological:      Mental Status: She is alert.   Psychiatric:         Attention and Perception: Attention normal.         Mood and Affect: Affect normal.         Speech: Speech normal.         Behavior: Behavior is cooperative.      Comments: Pt was calm while speaking.         Significant Labs:  CBC: No results for input(s): "WBC", "HGB", "HCT", "PLT" in the last 48 hours.  CMP: No results for input(s): "GLU", "CALCIUM", "ALBUMIN", "PROT", "NA", "K", "CO2", "CL", "BUN", "CREATININE", "ALKPHOS", "ALT", "AST", "BILITOT" in the last 48 hours.    Significant Imaging:  Imaging results within the past 24 hours have been reviewed.    Assessment/Plan: "     There are no hospital problems to display for this patient.        Impression:  GERD, chronically, esophageal dysphagia  Plan:  EGD with dilation of the esophagus.    Junior Del Real MD  Gastroenterology  [facility] - Endoscopy

## 2024-12-10 NOTE — DISCHARGE INSTRUCTIONS
Procedure Date  12/10/24     Impression  Overall Impression:   Performed forceps biopsies in the stomach  Performed forceps biopsies in the esophagus  Dilated in the esophagus with Larson dilator  Erythematous mucosa in the fundus of the stomach and antrum; performed cold forceps biopsy  The mucosa of the esophagus was normal with Z-line at 39 cm.  The distal esophagus was biopsied and the esophagus was dilated with a 48 French Larson dilator due to esophageal dysphagia.  The stomach showed erythema without ulcers and biopsies were obtained.  Diminutive fundic gland polyps were seen.  The pyloric channel was patent.  Mucosa of the duodenal bulb through 3rd portion was normal-appearing.  Impression: GERD, esophageal dysphagia, gastritis, status post dilation of the esophagus.  Recommendation  Await pathology results, due: 12/12/2024      Disposition:  Discharge patient to home in stable condition.  Avoid nonsteroidal anti-inflammatories, continue Protonix as needed, repeat dilation of the esophagus p.r.n..  Resume diet, no driving until tomorrow.  Follow up with PCP as scheduled, return GI clinic p.r.n..     Indication  Esophageal dysphagia, GERD

## 2024-12-10 NOTE — ANESTHESIA POSTPROCEDURE EVALUATION
Anesthesia Post Evaluation    Patient: Komal Busch    Procedure(s) Performed: * No procedures listed *    Final Anesthesia Type: general      Patient location during evaluation: GI PACU  Patient participation: Yes- Able to Participate  Level of consciousness: responds to stimulation  Post-procedure vital signs: reviewed and stable  Pain management: adequate  Airway patency: patent  MCKAY mitigation strategies: Multimodal analgesia  PONV status at discharge: No PONV  Anesthetic complications: no      Cardiovascular status: hemodynamically stable  Respiratory status: unassisted, spontaneous ventilation and room air  Hydration status: euvolemic  Follow-up not needed.  Comments: The pt was not arousable even with painful stimulation during the procedure.   Refer to nursing note for pain/sonja score upon discharge from recovery.               Vitals Value Taken Time   /41 12/10/24 0932   Temp 36.1 °C (97 °F) 12/10/24 0917   Pulse 59 12/10/24 0934   Resp 13 12/10/24 0934   SpO2 95 % 12/10/24 0934   Vitals shown include unfiled device data.      Event Time   Out of Recovery 09:44:06         Pain/Sonja Score: Sonja Score: 10 (12/10/2024  9:43 AM)

## 2024-12-10 NOTE — TRANSFER OF CARE
"Anesthesia Transfer of Care Note    Patient: Komal Busch    Procedure(s) Performed: * No procedures listed *    Patient location: GI    Anesthesia Type: general    Transport from OR: Transported from OR on room air with adequate spontaneous ventilation    Post pain: adequate analgesia    Post assessment: no apparent anesthetic complications    Post vital signs: stable    Level of consciousness: responds to stimulation    Nausea/Vomiting: no nausea/vomiting    Complications: none    Transfer of care protocol was followed      Last vitals: Visit Vitals  BP (!) 101/46 (BP Location: Left arm, Patient Position: Lying)   Pulse 63   Temp 36.1 °C (97 °F) (Oral)   Resp 13   Ht 5' 8" (1.727 m)   Wt 134.7 kg (297 lb)   SpO2 99%   Breastfeeding No   BMI 45.16 kg/m²     "

## 2024-12-11 ENCOUNTER — TELEPHONE (OUTPATIENT)
Dept: GASTROENTEROLOGY | Facility: CLINIC | Age: 44
End: 2024-12-11
Payer: COMMERCIAL

## 2024-12-11 LAB
ESTROGEN SERPL-MCNC: NORMAL PG/ML
INSULIN SERPL-ACNC: NORMAL U[IU]/ML
LAB AP GROSS DESCRIPTION: NORMAL
LAB AP LABORATORY NOTES: NORMAL
T3RU NFR SERPL: NORMAL %

## 2024-12-11 NOTE — PROGRESS NOTES
EGD biopsies show gastritis without H. Pylori. The esophagus biopsies were normal appearing. Recommend: avoid non-steroidal antiinflammatories, continue Protonix, repeat dilation of the esophagus as needed.

## 2024-12-11 NOTE — TELEPHONE ENCOUNTER
Results and recommendations called to patient. Verbalized understanding.            ----- Message from Junior Del Real MD sent at 12/11/2024  2:36 PM CST -----  EGD biopsies show gastritis without H. Pylori. The esophagus biopsies were normal appearing. Recommend: avoid non-steroidal antiinflammatories, continue Protonix, repeat dilation of the esophagus as needed.

## 2024-12-16 ENCOUNTER — OFFICE VISIT (OUTPATIENT)
Dept: FAMILY MEDICINE | Facility: CLINIC | Age: 44
End: 2024-12-16
Payer: COMMERCIAL

## 2024-12-16 VITALS
DIASTOLIC BLOOD PRESSURE: 66 MMHG | HEIGHT: 68 IN | HEART RATE: 75 BPM | RESPIRATION RATE: 18 BRPM | SYSTOLIC BLOOD PRESSURE: 127 MMHG | TEMPERATURE: 98 F | WEIGHT: 293 LBS | BODY MASS INDEX: 44.41 KG/M2 | OXYGEN SATURATION: 98 %

## 2024-12-16 DIAGNOSIS — J34.89 SORE IN NOSE: ICD-10-CM

## 2024-12-16 DIAGNOSIS — T14.8XXA PULLED MUSCLE: ICD-10-CM

## 2024-12-16 DIAGNOSIS — R07.89 STERNUM PAIN: Primary | ICD-10-CM

## 2024-12-16 PROCEDURE — 3061F NEG MICROALBUMINURIA REV: CPT | Mod: ,,,

## 2024-12-16 PROCEDURE — 3008F BODY MASS INDEX DOCD: CPT | Mod: ,,,

## 2024-12-16 PROCEDURE — 99212 OFFICE O/P EST SF 10 MIN: CPT | Mod: ,,,

## 2024-12-16 PROCEDURE — 1159F MED LIST DOCD IN RCRD: CPT | Mod: ,,,

## 2024-12-16 PROCEDURE — 3044F HG A1C LEVEL LT 7.0%: CPT | Mod: ,,,

## 2024-12-16 PROCEDURE — 1160F RVW MEDS BY RX/DR IN RCRD: CPT | Mod: ,,,

## 2024-12-16 PROCEDURE — 3078F DIAST BP <80 MM HG: CPT | Mod: ,,,

## 2024-12-16 PROCEDURE — 3066F NEPHROPATHY DOC TX: CPT | Mod: ,,,

## 2024-12-16 PROCEDURE — 3074F SYST BP LT 130 MM HG: CPT | Mod: ,,,

## 2024-12-16 RX ORDER — CYCLOBENZAPRINE HCL 5 MG
5 TABLET ORAL 3 TIMES DAILY PRN
Qty: 30 TABLET | Refills: 0 | Status: SHIPPED | OUTPATIENT
Start: 2024-12-16 | End: 2024-12-26

## 2024-12-16 RX ORDER — MUPIROCIN 20 MG/G
OINTMENT TOPICAL 3 TIMES DAILY
Qty: 30 G | Refills: 0 | Status: SHIPPED | OUTPATIENT
Start: 2024-12-16

## 2024-12-16 NOTE — PROGRESS NOTES
"HPI:   Komal Busch is a pleasant 44 y.o. patient who reports to clinic with complaints of Sternum pain for 3 weeks. She states around 3 weeks ago she got a sharp pain in her sternum area and it has been sore since then. She denies any injury that she is aware of. She does state she has been going to the gym. She went to the cardiologist and had blood work and a cardio work up. She had an EKG done all on the 3rd of December. She had her esophagus dilated after that on Dec 10 th. She states after her EGD the GI doctor did say she was irritated in her esophagus and had polys removed and biopsied.  She states that her sternum area has been hurting since then. She states "I know it is not my heart" but that she isn't sure. She does state that she has been working out a lot and her right shoulder has been hurting when she lifts things on the the right side. She states today is the first day it has not hurt and she has been able to do things like usual. She denies any shortness of breath or chest pain. She denies any other issues.                        Past Medical History:   Diagnosis Date    Encounter for screening for COVID-19 9/29/2021    Exposure to COVID-19 virus 7/28/2021    GERD (gastroesophageal reflux disease)     Hypertension     Nonintractable headache 8/16/2021    Pleurisy 10/29/2021       PAST SURGICAL HISTORY:   Past Surgical History:   Procedure Laterality Date    CHOLECYSTECTOMY      HYSTERECTOMY      KNEE SURGERY Left        MEDICATIONS:    Current Outpatient Medications:     nebivoloL (BYSTOLIC) 20 mg Tab, Take 1 tablet (20 mg total) by mouth Daily., Disp: 90 tablet, Rfl: 1    pantoprazole (PROTONIX) 40 MG tablet, Take 1 tablet (40 mg total) by mouth once daily., Disp: 90 tablet, Rfl: 1    aspirin (ECOTRIN) 81 MG EC tablet, Take 81 mg by mouth once daily. (Patient not taking: Reported on 12/16/2024), Disp: , Rfl:     cyclobenzaprine (FLEXERIL) 5 MG tablet, Take 1 tablet (5 mg total) by mouth 3 " (three) times daily as needed for Muscle spasms., Disp: 30 tablet, Rfl: 0    mupirocin (BACTROBAN) 2 % ointment, Apply topically 3 (three) times daily., Disp: 30 g, Rfl: 0    promethazine (PHENERGAN) 12.5 MG Tab, Take 1 tablet (12.5 mg total) by mouth every 6 (six) hours as needed (nausea vomitting). (Patient not taking: Reported on 12/16/2024), Disp: 30 tablet, Rfl: 0    sumatriptan (IMITREX) 25 MG Tab, Take 1 tablet (25 mg total) by mouth every 6 (six) hours as needed (headache)., Disp: 30 tablet, Rfl: 0    ALLERGIES:   Review of patient's allergies indicates:   Allergen Reactions    Keflex [cephalexin]     Neosporin [neomycin-bacitracnzn-polymyxnb]     Pcn [penicillins]     Reglan [metoclopramide]          Review of Systems   Constitutional: Negative.  Negative for activity change, chills and fever.   HENT: Negative.  Negative for drooling and nosebleeds.    Eyes: Negative.    Respiratory: Negative.  Negative for chest tightness.    Cardiovascular: Negative.  Negative for chest pain (sternum area pain) and palpitations.   Gastrointestinal: Negative.    Endocrine: Negative.    Genitourinary: Negative.  Negative for difficulty urinating.   Musculoskeletal: Negative.    Integumentary:  Negative.   Allergic/Immunologic: Negative.    Neurological: Negative.  Negative for dizziness.   Hematological: Negative.    Psychiatric/Behavioral: Negative.     All other systems reviewed and are negative.         Physical Exam  Constitutional:       General: She is not in acute distress.     Appearance: Normal appearance. She is well-developed. She is obese. She is not ill-appearing.   HENT:      Head: Normocephalic and atraumatic.      Right Ear: Tympanic membrane normal.      Left Ear: Tympanic membrane normal.      Nose: Nose normal.      Mouth/Throat:      Mouth: Mucous membranes are moist.      Pharynx: Oropharynx is clear. No posterior oropharyngeal erythema.   Cardiovascular:      Rate and Rhythm: Normal rate and regular  "rhythm.      Pulses: Normal pulses.      Heart sounds: Normal heart sounds.   Pulmonary:      Effort: Pulmonary effort is normal. No accessory muscle usage or respiratory distress.      Breath sounds: Normal breath sounds.   Abdominal:      General: Abdomen is flat. Bowel sounds are normal. There is no distension.      Palpations: Abdomen is soft.      Tenderness: There is no abdominal tenderness.   Musculoskeletal:         General: Normal range of motion.      Cervical back: Normal range of motion.   Skin:     General: Skin is warm and dry.      Capillary Refill: Capillary refill takes less than 2 seconds.   Neurological:      Mental Status: She is alert and oriented to person, place, and time. Mental status is at baseline.   Psychiatric:         Mood and Affect: Mood normal.         Speech: Speech normal.         Behavior: Behavior normal. Behavior is cooperative.         Thought Content: Thought content normal.          VITAL SIGNS:   /66 (BP Location: Left arm, Patient Position: Sitting)   Pulse 75   Temp 98.3 °F (36.8 °C) (Oral)   Resp 18   Ht 5' 8" (1.727 m)   Wt 133.4 kg (294 lb)   SpO2 98%   BMI 44.70 kg/m²       ASSESSMENT/PLAN  1. Sternum pain  Assessment & Plan:  Chest xray  Cardio work up negative two weeks ago.   Egd done 6 days ago  Heating pad  Possible pulled muscle.          Orders:  -     X-Ray Chest PA And Lateral; Future; Expected date: 12/16/2024  -     cyclobenzaprine (FLEXERIL) 5 MG tablet; Take 1 tablet (5 mg total) by mouth 3 (three) times daily as needed for Muscle spasms.  Dispense: 30 tablet; Refill: 0    2. Pulled muscle  -     cyclobenzaprine (FLEXERIL) 5 MG tablet; Take 1 tablet (5 mg total) by mouth 3 (three) times daily as needed for Muscle spasms.  Dispense: 30 tablet; Refill: 0    3. Sore in nose  -     mupirocin (BACTROBAN) 2 % ointment; Apply topically 3 (three) times daily.  Dispense: 30 g; Refill: 0             There are no Patient Instructions on file for this " visit.  Orders Placed This Encounter   Procedures    X-Ray Chest PA And Lateral     Standing Status:   Future     Number of Occurrences:   1     Standing Expiration Date:   12/16/2025     Order Specific Question:   May the Radiologist modify the order per protocol to meet the clinical needs of the patient?     Answer:   Yes     Order Specific Question:   Release to patient     Answer:   Immediate            3

## 2024-12-16 NOTE — ASSESSMENT & PLAN NOTE
Chest xray  Cardio work up negative two weeks ago.   Egd done 6 days ago  Heating pad  Possible pulled muscle.

## 2025-01-22 ENCOUNTER — PATIENT OUTREACH (OUTPATIENT)
Facility: HOSPITAL | Age: 45
End: 2025-01-22
Payer: COMMERCIAL

## 2025-01-22 NOTE — PROGRESS NOTES
Population Health Chart Review & Patient Outreach Details  Per [facility] website, insurance is active and pt is listed on the attributed list needing a healthy you performed in   HY scheduled for 3/4/2025  [facility]1 completed    Further Action Needed If Patient Returns Outreach:            Updates Requested / Reviewed:     []  Care Everywhere    []     []  External Sources ([facility], [facility], [facility], etc.)    [] [facility]   [] [facility]   [] Other:    []  Care Team Updated   []  Removed  or Duplicate Orders   []  Immunization Reconciliation Completed / Queried    [] [place]   [] [place]   [] [place]   [] [place]      Health Maintenance Topics Addressed and Outreach Outcomes / Actions Taken:             Breast Cancer Screening []  Mammogram Order Placed    []  Mammogram Screening Scheduled    []  External Records Requested & Care Team Updated if Applicable    []  External Records Uploaded & Care Team Updated if Applicable    []  Pt Declined Scheduling Mammogram    []  Pt Will Schedule with External Provider / Order Routed & Care Team Updated if Applicable              Cervical Cancer Screening []  Pap Smear Scheduled in Primary Care or OBGYN    []  External Records Requested & Care Team Updated if Applicable       []  External Records Uploaded, Care Team Updated, & History Updated if Applicable    []  Patient Declined Scheduling Pap Smear    []  Patient Will Schedule with External Provider & Care Team Updated if Applicable                  Colorectal Cancer Screening []  Colonoscopy Case Request / Referral / Home Test Order Placed    []  External Records Requested & Care Team Updated if Applicable    []  External Records Uploaded, Care Team Updated, & History Updated if Applicable    []  Patient Declined Completing Colon Cancer Screening    []  Patient Will Schedule with External Provider & Care Team Updated if Applicable    []  Fit Kit Mailed (add the SmartPhrase under additional notes)    []  Reminded  Patient to Complete Home Test                Diabetic Eye Exam []  Eye Exam Screening Order Placed    []  Eye Camera Scheduled or Optometry/Ophthalmology Referral Placed    []  External Records Requested & Care Team Updated if Applicable    []  External Records Uploaded, Care Team Updated, & History Updated if Applicable    []  Patient Declined Scheduling Eye Exam    []  Patient Will Schedule with External Provider & Care Team Updated if Applicable             Blood Pressure Control []  Primary Care Follow Up Visit Scheduled     []  Remote Blood Pressure Reading Captured    []  Patient Declined Remote Reading or Scheduling Appt - Escalated to PCP    []  Patient Will Call Back or Send Portal Message with Reading                 HbA1c & Other Labs []  Overdue Lab(s) Ordered    []  Overdue Lab(s) Scheduled    []  External Records Uploaded & Care Team Updated if Applicable    []  Primary Care Follow Up Visit Scheduled     []  Reminded Patient to Complete A1c Home Test    []  Patient Declined Scheduling Labs or Will Call Back to Schedule    []  Patient Will Schedule with External Provider / Order Routed, & Care Team Updated if Applicable           Primary Care Appointment []  Primary Care Appt Scheduled    []  Patient Declined Scheduling or Will Call Back to Schedule    []  Pt Established with External Provider, Updated Care Team, & Informed Pt to Notify Payor if Applicable           Medication Adherence /    Statin Use []  Primary Care Appointment Scheduled    []  Patient Reminded to  Prescription    []  Patient Declined, Provider Notified if Needed    []  Sent Provider Message to Review to Evaluate Pt for Statin, Add Exclusion Dx Codes, Document   Exclusion in Problem List, Change Statin Intensity Level to Moderate or High Intensity if Applicable                Osteoporosis Screening []  Dexa Order Placed    []  Dexa Appointment Scheduled    []  External Records Requested & Care Team Updated    []  External  Records Uploaded, Care Team Updated, & History Updated if Applicable    []  Patient Declined Scheduling Dexa or Will Call Back to Schedule    []  Patient Will Schedule with External Provider / Order Routed & Care Team Updated if Applicable       Additional Notes:

## 2025-01-27 ENCOUNTER — OFFICE VISIT (OUTPATIENT)
Dept: FAMILY MEDICINE | Facility: CLINIC | Age: 45
End: 2025-01-27
Payer: COMMERCIAL

## 2025-01-27 VITALS
HEIGHT: 68 IN | RESPIRATION RATE: 18 BRPM | SYSTOLIC BLOOD PRESSURE: 135 MMHG | HEART RATE: 90 BPM | BODY MASS INDEX: 44.35 KG/M2 | TEMPERATURE: 99 F | WEIGHT: 292.63 LBS | OXYGEN SATURATION: 96 % | DIASTOLIC BLOOD PRESSURE: 89 MMHG

## 2025-01-27 DIAGNOSIS — R50.9 FEVER, UNSPECIFIED FEVER CAUSE: ICD-10-CM

## 2025-01-27 DIAGNOSIS — U07.1 COVID-19: Primary | ICD-10-CM

## 2025-01-27 DIAGNOSIS — U07.1 COVID-19 VIRUS DETECTED: ICD-10-CM

## 2025-01-27 DIAGNOSIS — R05.9 COUGH, UNSPECIFIED TYPE: ICD-10-CM

## 2025-01-27 LAB
CTP QC/QA: YES
MOLECULAR STREP A: NEGATIVE
POC MOLECULAR INFLUENZA A AGN: NEGATIVE
POC MOLECULAR INFLUENZA B AGN: NEGATIVE
SARS-COV-2 RDRP RESP QL NAA+PROBE: POSITIVE

## 2025-01-27 PROCEDURE — 1160F RVW MEDS BY RX/DR IN RCRD: CPT | Mod: ,,,

## 2025-01-27 PROCEDURE — 3075F SYST BP GE 130 - 139MM HG: CPT | Mod: ,,,

## 2025-01-27 PROCEDURE — 3079F DIAST BP 80-89 MM HG: CPT | Mod: ,,,

## 2025-01-27 PROCEDURE — 87635 SARS-COV-2 COVID-19 AMP PRB: CPT | Mod: QW,,,

## 2025-01-27 PROCEDURE — 99214 OFFICE O/P EST MOD 30 MIN: CPT | Mod: ,,,

## 2025-01-27 PROCEDURE — 87651 STREP A DNA AMP PROBE: CPT | Mod: QW,,,

## 2025-01-27 PROCEDURE — 87502 INFLUENZA DNA AMP PROBE: CPT | Mod: QW,,,

## 2025-01-27 PROCEDURE — 3008F BODY MASS INDEX DOCD: CPT | Mod: ,,,

## 2025-01-27 PROCEDURE — 1159F MED LIST DOCD IN RCRD: CPT | Mod: ,,,

## 2025-01-27 RX ORDER — FLUTICASONE PROPIONATE 50 MCG
1 SPRAY, SUSPENSION (ML) NASAL DAILY
COMMUNITY
Start: 2024-12-29

## 2025-01-27 RX ORDER — CHLOPHEDIANOL HCL AND PYRILAMINE MALEATE 12.5; 12.5 MG/5ML; MG/5ML
10 SOLUTION ORAL 2 TIMES DAILY PRN
Qty: 473 ML | Refills: 0 | Status: SHIPPED | OUTPATIENT
Start: 2025-01-27

## 2025-01-27 RX ORDER — BROMPHENIRAMINE MALEATE, DEXTROMETHORPHAN HYDROBROMIDE, PHENYLEPHRINE HYDROCHLORIDE 1; 5; 2.5 MG/5ML; MG/5ML; MG/5ML
10 LIQUID ORAL EVERY 4 HOURS PRN
COMMUNITY
Start: 2024-12-29 | End: 2025-01-27

## 2025-01-27 NOTE — PROGRESS NOTES
HPI:   Komal Busch is a pleasant 45 y.o. patient who reports to clinic with complaints of cough, fever, headache, ear aches, and body aches for about one day. Had flu A 12/29/24. She states her cough is non productive, she just started coughing last night. She is unsure on how high her fever got, but she did wake up with chills and face was red. She also complains of ear pain that started this am. She denies any shortness of breath or chest pain.                    Past Medical History:   Diagnosis Date    Encounter for screening for COVID-19 9/29/2021    Exposure to COVID-19 virus 7/28/2021    GERD (gastroesophageal reflux disease)     Hypertension     Nonintractable headache 8/16/2021    Pleurisy 10/29/2021       PAST SURGICAL HISTORY:   Past Surgical History:   Procedure Laterality Date    CHOLECYSTECTOMY      HYSTERECTOMY      KNEE SURGERY Left        MEDICATIONS:    Current Outpatient Medications:     fluticasone propionate (FLONASE) 50 mcg/actuation nasal spray, 1 spray by Each Nostril route once daily., Disp: , Rfl:     mupirocin (BACTROBAN) 2 % ointment, Apply topically 3 (three) times daily., Disp: 30 g, Rfl: 0    nebivoloL (BYSTOLIC) 20 mg Tab, Take 1 tablet (20 mg total) by mouth Daily., Disp: 90 tablet, Rfl: 1    pantoprazole (PROTONIX) 40 MG tablet, Take 1 tablet (40 mg total) by mouth once daily., Disp: 90 tablet, Rfl: 1    pyrilamine-chlophedianoL (NINJACOF) 12.5-12.5 mg/5 mL Liqd, Take 10 mLs by mouth 2 (two) times daily as needed (cough)., Disp: 473 mL, Rfl: 0    ALLERGIES:   Review of patient's allergies indicates:   Allergen Reactions    Keflex [cephalexin]     Neosporin [neomycin-bacitracnzn-polymyxnb]     Pcn [penicillins]     Reglan [metoclopramide]          Review of Systems   Constitutional:  Positive for fatigue and fever. Negative for activity change and chills.   HENT:  Positive for nasal congestion and sneezing. Negative for drooling and nosebleeds.    Eyes: Negative.     Respiratory:  Positive for cough. Negative for chest tightness.    Cardiovascular: Negative.  Negative for chest pain and palpitations.   Gastrointestinal: Negative.    Endocrine: Negative.    Genitourinary: Negative.  Negative for difficulty urinating.   Musculoskeletal: Negative.    Integumentary:  Negative.   Allergic/Immunologic: Negative.    Neurological: Negative.  Negative for dizziness.   Hematological: Negative.    Psychiatric/Behavioral: Negative.     All other systems reviewed and are negative.         Physical Exam  Constitutional:       General: She is not in acute distress.     Appearance: Normal appearance. She is well-developed. She is obese. She is ill-appearing.   HENT:      Head: Normocephalic and atraumatic.      Right Ear: Tympanic membrane normal.      Left Ear: Tympanic membrane normal.      Nose: Nose normal.      Mouth/Throat:      Mouth: Mucous membranes are moist.      Pharynx: Oropharynx is clear. No posterior oropharyngeal erythema.   Cardiovascular:      Rate and Rhythm: Normal rate and regular rhythm.      Pulses: Normal pulses.      Heart sounds: Normal heart sounds.   Pulmonary:      Effort: Pulmonary effort is normal. No accessory muscle usage or respiratory distress.      Breath sounds: Normal breath sounds.   Abdominal:      General: Abdomen is flat. Bowel sounds are normal. There is no distension.      Palpations: Abdomen is soft.      Tenderness: There is no abdominal tenderness.   Musculoskeletal:         General: Normal range of motion.      Cervical back: Normal range of motion.   Skin:     General: Skin is warm and dry.      Capillary Refill: Capillary refill takes less than 2 seconds.   Neurological:      Mental Status: She is alert and oriented to person, place, and time. Mental status is at baseline.   Psychiatric:         Mood and Affect: Mood normal.         Speech: Speech normal.         Behavior: Behavior normal. Behavior is cooperative.         Thought Content:  "Thought content normal.          VITAL SIGNS:   /89 (BP Location: Left arm, Patient Position: Sitting)   Pulse 90   Temp 99.1 °F (37.3 °C) (Oral)   Resp 18   Ht 5' 8" (1.727 m)   Wt 132.7 kg (292 lb 9.6 oz)   SpO2 96%   BMI 44.49 kg/m²       ASSESSMENT/PLAN  1. COVID-19  Assessment & Plan:  Patient tested positive for Covid 19 with only mild symptoms at this point.    -Advised to take Vitamin D 5000 units daily, Zinc 50 mg daily, Vitamin C 1000mg daily, Zyrtec 10 mg daily, Pepcid 20mg daily, Aspirin 81mg daily - all for 14 days.  -May treat cough with OTC anti-tussive meds, body aches or fever with Tylenol or Motrin OTC.    -Instructed based on the most recent CDC guidelines to quarantine for 5 days from date of positive and  then wear a well fitting mask for 5 additional days after leaving quarantine.      If you are still experiencing symptoms or fever 5 days after testing positive, then continue to quarantine for 5 additional days.     - Instructed that all household contacts or those you've been in very close contact with should also quarantine for 5 days followed by 5 days of wearing a well fitting mask.  If household contacts develop symptoms they should seek testing or start their quarantine over from the day that symptoms develop.      -Quarantine means stay home, wear a mask when you can not avoid being around someone else in your home, try to maintain at least 6 feet from others in your home, and ideally confine yourself to only one room and one bathroom away from other household member.    Use frequent hand washing and clean touched surfaces frequently.    Remain active while at home in quarantine - get up and walk around outside or in your home.    -RTC for re-evaluation for worsening symptoms or go to ED.        2. Fever, unspecified fever cause  Assessment & Plan:  Increase fluid intake  Rotate tylenol and Ib profen as needed for fever or body aches.  Follow up as needed with the clinc "       Orders:  -     POCT Strep A, Molecular  -     POCT COVID-19 Rapid Screening  -     POCT Influenza A/B Molecular    3. Cough, unspecified type  Assessment & Plan:  COVID POSITIVE  FLU and Strep neg  Increase fluid intake  Start Ninja Cough as needed for cough  Follow up with the clinic if s/s persist or become worse.       Orders:  -     pyrilamine-chlophedianoL (NINJACOF) 12.5-12.5 mg/5 mL Liqd; Take 10 mLs by mouth 2 (two) times daily as needed (cough).  Dispense: 473 mL; Refill: 0             There are no Patient Instructions on file for this visit.  Orders Placed This Encounter   Procedures    POCT Strep A, Molecular    POCT COVID-19 Rapid Screening    POCT Influenza A/B Molecular

## 2025-01-27 NOTE — ASSESSMENT & PLAN NOTE
COVID POSITIVE  FLU and Strep neg  Increase fluid intake  Start Ninja Cough as needed for cough  Follow up with the clinic if s/s persist or become worse.

## 2025-01-27 NOTE — ASSESSMENT & PLAN NOTE
Patient tested positive for Covid 19 with only mild symptoms at this point.    -Advised to take Vitamin D 5000 units daily, Zinc 50 mg daily, Vitamin C 1000mg daily, Zyrtec 10 mg daily, Pepcid 20mg daily, Aspirin 81mg daily - all for 14 days.  -May treat cough with OTC anti-tussive meds, body aches or fever with Tylenol or Motrin OTC.    -Instructed based on the most recent CDC guidelines to quarantine for 5 days from date of positive and  then wear a well fitting mask for 5 additional days after leaving quarantine.      If you are still experiencing symptoms or fever 5 days after testing positive, then continue to quarantine for 5 additional days.     - Instructed that all household contacts or those you've been in very close contact with should also quarantine for 5 days followed by 5 days of wearing a well fitting mask.  If household contacts develop symptoms they should seek testing or start their quarantine over from the day that symptoms develop.      -Quarantine means stay home, wear a mask when you can not avoid being around someone else in your home, try to maintain at least 6 feet from others in your home, and ideally confine yourself to only one room and one bathroom away from other household member.    Use frequent hand washing and clean touched surfaces frequently.    Remain active while at home in quarantine - get up and walk around outside or in your home.    -RTC for re-evaluation for worsening symptoms or go to ED.

## 2025-04-13 ENCOUNTER — HOSPITAL ENCOUNTER (EMERGENCY)
Facility: HOSPITAL | Age: 45
Discharge: HOME | End: 2025-04-13
Payer: COMMERCIAL

## 2025-04-13 VITALS
RESPIRATION RATE: 18 BRPM | DIASTOLIC BLOOD PRESSURE: 73 MMHG | WEIGHT: 293 LBS | TEMPERATURE: 98 F | HEART RATE: 60 BPM | OXYGEN SATURATION: 98 % | BODY MASS INDEX: 44.41 KG/M2 | HEIGHT: 68 IN | SYSTOLIC BLOOD PRESSURE: 123 MMHG

## 2025-04-13 DIAGNOSIS — S49.91XA INJURY OF RIGHT SHOULDER: ICD-10-CM

## 2025-04-13 DIAGNOSIS — S69.91XA RIGHT WRIST INJURY: ICD-10-CM

## 2025-04-13 DIAGNOSIS — S89.92XA KNEE INJURY, LEFT, INITIAL ENCOUNTER: ICD-10-CM
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
## 2025-04-13 DIAGNOSIS — S16.1XXA STRAIN OF NECK MUSCLE, INITIAL ENCOUNTER: ICD-10-CM

## 2025-04-13 PROCEDURE — G0390 TRAUMA RESPONS W/HOSP CRITI: HCPCS | Mod: TRAUMA2

## 2025-04-13 PROCEDURE — 99285 EMERGENCY DEPT VISIT HI MDM: CPT | Mod: 25

## 2025-04-13 PROCEDURE — 25000003 PHARM REV CODE 250: Performed by: NURSE PRACTITIONER

## 2025-04-13 PROCEDURE — 99284 EMERGENCY DEPT VISIT MOD MDM: CPT | Mod: GF,,, | Performed by: NURSE PRACTITIONER

## 2025-04-13 PROCEDURE — 90471 IMMUNIZATION ADMIN: CPT | Performed by: NURSE PRACTITIONER

## 2025-04-13 PROCEDURE — 63600175 PHARM REV CODE 636 W HCPCS: Performed by: NURSE PRACTITIONER

## 2025-04-13 PROCEDURE — 90715 TDAP VACCINE 7 YRS/> IM: CPT | Performed by: NURSE PRACTITIONER

## 2025-04-13 PROCEDURE — 96372 THER/PROPH/DIAG INJ SC/IM: CPT | Performed by: NURSE PRACTITIONER

## 2025-04-13 RX ORDER — MUPIROCIN 20 MG/G
OINTMENT TOPICAL DAILY
Qty: 22 G | Refills: 0 | Status: SHIPPED | OUTPATIENT
Start: 2025-04-13 | End: 2025-04-18

## 2025-04-13 RX ORDER — KETOROLAC TROMETHAMINE 30 MG/ML
60 INJECTION, SOLUTION INTRAMUSCULAR; INTRAVENOUS
Status: COMPLETED | OUTPATIENT
Start: 2025-04-13 | End: 2025-04-13

## 2025-04-13 RX ORDER — MUPIROCIN 20 MG/G
1 OINTMENT TOPICAL
Status: COMPLETED | OUTPATIENT
Start: 2025-04-13 | End: 2025-04-13

## 2025-04-13 RX ORDER — CYCLOBENZAPRINE HCL 10 MG
10 TABLET ORAL 3 TIMES DAILY PRN
Qty: 15 TABLET | Refills: 0 | Status: SHIPPED | OUTPATIENT
Start: 2025-04-13 | End: 2025-04-18

## 2025-04-13 RX ADMIN — KETOROLAC TROMETHAMINE 60 MG: 30 INJECTION, SOLUTION INTRAMUSCULAR at 04:04

## 2025-04-13 RX ADMIN — MUPIROCIN 1 TUBE: 20 OINTMENT TOPICAL at 03:04

## 2025-04-13 RX ADMIN — CLOSTRIDIUM TETANI TOXOID ANTIGEN (FORMALDEHYDE INACTIVATED), CORYNEBACTERIUM DIPHTHERIAE TOXOID ANTIGEN (FORMALDEHYDE INACTIVATED), BORDETELLA PERTUSSIS TOXOID ANTIGEN (GLUTARALDEHYDE INACTIVATED), BORDETELLA PERTUSSIS FILAMENTOUS HEMAGGLUTININ ANTIGEN (FORMALDEHYDE INACTIVATED), BORDETELLA PERTUSSIS PERTACTIN ANTIGEN, AND BORDETELLA PERTUSSIS FIMBRIAE 2/3 ANTIGEN 0.5 ML: 5; 2; 2.5; 5; 3; 5 INJECTION, SUSPENSION INTRAMUSCULAR at 03:04
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
## 2025-04-13 NOTE — DISCHARGE INSTRUCTIONS
-Cleanse friction burn and abrasion with mild soap, warm water, rinse well and pat dry. Apply thin layer of triple antibiotic ointment to affected areas daily. Apply non stick dressing.  -May take Motrin 200 mg: 3 tabs by mouth every 6 hours as needed for pain. May alternate with Tylenol 500 m tabs by mouth every 6 hours as needed for pain.  -Flexeril 10 mg take one tab by mouth every 8 hours as needed for muscle spasms. Do not take while working or driving due to will cause drowsiness.

## 2025-04-25 ENCOUNTER — OFFICE VISIT (OUTPATIENT)
Dept: FAMILY MEDICINE | Facility: CLINIC | Age: 45
End: 2025-04-25
Payer: COMMERCIAL

## 2025-04-25 VITALS
HEART RATE: 63 BPM | WEIGHT: 293 LBS | HEIGHT: 68 IN | BODY MASS INDEX: 44.41 KG/M2 | DIASTOLIC BLOOD PRESSURE: 78 MMHG | RESPIRATION RATE: 18 BRPM | OXYGEN SATURATION: 97 % | TEMPERATURE: 98 F | SYSTOLIC BLOOD PRESSURE: 127 MMHG

## 2025-04-25 DIAGNOSIS — Z11.4 ENCOUNTER FOR SCREENING FOR HIV: ICD-10-CM

## 2025-04-25 DIAGNOSIS — Z13.220 SCREENING FOR LIPOID DISORDERS: ICD-10-CM

## 2025-04-25 DIAGNOSIS — E11.8 DIABETES MELLITUS TYPE 2 WITH COMPLICATIONS: ICD-10-CM

## 2025-04-25 DIAGNOSIS — Z13.1 SCREENING FOR DIABETES MELLITUS: ICD-10-CM

## 2025-04-25 DIAGNOSIS — I10 HYPERTENSION, UNSPECIFIED TYPE: ICD-10-CM

## 2025-04-25 DIAGNOSIS — Z11.59 ENCOUNTER FOR HEPATITIS C SCREENING TEST FOR LOW RISK PATIENT: ICD-10-CM

## 2025-04-25 DIAGNOSIS — K21.9 GASTROESOPHAGEAL REFLUX DISEASE, UNSPECIFIED WHETHER ESOPHAGITIS PRESENT: ICD-10-CM

## 2025-04-25 DIAGNOSIS — Z00.00 ROUTINE GENERAL MEDICAL EXAMINATION AT A HEALTH CARE FACILITY: Primary | ICD-10-CM

## 2025-04-25 RX ORDER — NEBIVOLOL 20 MG/1
1 TABLET ORAL DAILY
Qty: 90 TABLET | Refills: 1 | Status: SHIPPED | OUTPATIENT
Start: 2025-04-25

## 2025-04-25 RX ORDER — PANTOPRAZOLE SODIUM 40 MG/1
40 TABLET, DELAYED RELEASE ORAL DAILY
Qty: 90 TABLET | Refills: 1 | Status: SHIPPED | OUTPATIENT
Start: 2025-04-25

## 2025-04-25 NOTE — PATIENT INSTRUCTIONS
"Patient Education     Type 2 diabetes   The Basics   Written by the doctors and editors at [facility]   What is type 2 diabetes? -- This is a disorder that disrupts the way the body uses sugar. It is sometimes called type 2 diabetes mellitus.  All of the cells in the body need sugar to work normally. Sugar gets into the cells with the help of a hormone called insulin. Insulin is made by the pancreas, an organ in the belly. If there is not enough insulin, or if cells in the body don't respond normally to insulin, sugar builds up in the blood. That is what happens to people with diabetes.  There are 2 different types of diabetes:   In type 1 diabetes, the pancreas makes little or no insulin.   In type 2 diabetes, the pancreas still makes some insulin, but the cells in the body stop responding normally. Eventually, the pancreas cannot make enough insulin to keep up.  Having excess body weight or obesity increases a person's risk of developing type 2 diabetes. But people without excess body weight can get diabetes, too.  What are the symptoms of type 2 diabetes? -- Type 2 diabetes usually causes no symptoms. When symptoms do happen, they include:   Needing to urinate often   Intense thirst   Blurry vision  Can diabetes lead to other health problems? -- Yes. Type 2 diabetes might not make you feel sick. But if it is not managed, it can lead to serious problems over time, such as:   Heart attacks   Strokes   Kidney disease   Vision problems (or even blindness)   Pain or loss of feeling in the hands and feet   Needing to have fingers, toes, or other body parts removed (amputated)  How do I know if I have type 2 diabetes? -- Your doctor or nurse can do a blood test. There are 2 tests that can be used for this. Both involve measuring the amount of sugar in your blood, called your "blood sugar" or "blood glucose":   One of the tests measures your blood sugar at the time the blood sample is taken. This test is done in the " "morning. You can't eat or drink anything except water for at least 8 hours before the test.   The other test shows what your average blood sugar has been for the past 2 to 3 months. This blood test is called "hemoglobin A1C" or just "A1C." It can be checked at any time of the day, even if you have recently eaten.  How is type 2 diabetes treated? -- The goals of treatment are to manage your blood sugar and lower the risk of future problems that can happen in people with diabetes.  Treatment might include:   Lifestyle changes - This is an important part of managing diabetes. It includes eating healthy foods and getting plenty of physical activity.   Medicines - There are a few medicines that help lower blood sugar. Some people need to take pills that help the body make more insulin or that help insulin do its job. Others need insulin shots.  Depending on what medicines you take, you might need to check your blood sugar regularly at home. But not everyone with type 2 diabetes needs to do this. Your doctor or nurse will tell you if you should be checking your blood sugar, and when and how to do this.  Sometimes, people with type 2 diabetes also need medicines to help prevent problems caused by the disease. For instance, medicines used to lower blood pressure can reduce the chances of a heart attack or stroke.   General medical care - It's also important to take care of other areas of your health. This includes watching your blood pressure and cholesterol levels. You should also get certain vaccines, such as vaccines to protect against the flu and coronavirus disease 2019 ("COVID-19"). Some people also need a vaccine to prevent pneumonia.  Can type 2 diabetes be prevented? -- Yes. To lower your chances of getting type 2 diabetes, the most important thing you can do is eat a healthy diet and get plenty of physical activity. This can help you lose weight if you are overweight. But eating well and being active are also good " for your overall health. Even gentle activity, like walking, has benefits.  If you smoke, quitting can also lower your risk of type 2 diabetes. Quitting smoking can be difficult, but your doctor or nurse can help.  All topics are updated as new evidence becomes available and our peer review process is complete.  This topic retrieved from [employer] on: May 30, 2024.  Topic 32829 Version 23.0  Release: 32.5.3 - C32.150  © 2024 UpToDate, Inc. and/or its affiliates. All rights reserved.  Consumer Information Use and Disclaimer   Disclaimer: This generalized information is a limited summary of diagnosis, treatment, and/or medication information. It is not meant to be comprehensive and should be used as a tool to help the user understand and/or assess potential diagnostic and treatment options. It does NOT include all information about conditions, treatments, medications, side effects, or risks that may apply to a specific patient. It is not intended to be medical advice or a substitute for the medical advice, diagnosis, or treatment of a health care provider based on the health care provider's examination and assessment of a patient's specific and unique circumstances. Patients must speak with a health care provider for complete information about their health, medical questions, and treatment options, including any risks or benefits regarding use of medications. This information does not endorse any treatments or medications as safe, effective, or approved for treating a specific patient. UpToDate, Inc. and its affiliates disclaim any warranty or liability relating to this information or the use thereof.The use of this information is governed by the Terms of Use, available at https://www.wolterskluwer.com/en/know/clinical-effectiveness-terms. 2024© UpToDate, Inc. and its affiliates and/or licensors. All rights reserved.  Copyright   © 2024 UpToDate, Inc. and/or its affiliates. All rights reserved.  Patient Education    "  High blood pressure in adults   The Basics   Written by the doctors and editors at [facility]   What is high blood pressure? -- High blood pressure is a condition that puts you at risk for heart attack, stroke, and kidney disease. It does not usually cause symptoms. But it can be serious.  When your doctor or nurse tells you your blood pressure, they say 2 numbers. For instance, your doctor or nurse might say that your blood pressure is "130 over 80." The top number is the pressure inside your arteries when your heart is shaw. The bottom number is the pressure inside your arteries when your heart is relaxed.  "Elevated blood pressure" is a term doctors or nurses use as a warning. People with elevated blood pressure do not yet have high blood pressure. But their blood pressure is not as low as it should be for good health.  Many experts define high, elevated, and normal blood pressure as follows:   High - Top number of 130 or above and/or bottom number of 80 or above.   Elevated - Top number between 120 and 129 and bottom number of 79 or below.   Normal - Top number of 119 or below and bottom number of 79 or below.  This information is also in the table (table 1).  How can I lower my blood pressure? -- If your doctor or nurse prescribed blood pressure medicine, the most important thing you can do is to take it. If it causes side effects, do not just stop taking it. Instead, talk to your doctor or nurse about the problems it causes. They might be able to lower your dose or switch you to another medicine. If cost is a problem, mention that, too. They might be able to put you on a less expensive medicine. Taking your blood pressure medicine can keep you from having a heart attack or stroke, and it can save your life!  Can I do anything on my own? -- You have a lot of control over your blood pressure. To lower it:   Lose weight (if you are overweight).   Choose a diet low in fat and rich in fruits, vegetables, and " "low-fat dairy products.   Eat less salt.   Do something active for at least 30 minutes a day on most days of the week.   Drink less alcohol (if you drink more than 2 alcoholic drinks per day).  It's also a good idea to get a home blood pressure meter. People who check their own blood pressure at home do better at keeping it low and can sometimes even reduce the amount of medicine they take.  All topics are updated as new evidence becomes available and our peer review process is complete.  This topic retrieved from [employer] on: May 30, 2024.  Topic 48563 Version 23.0  Release: 32.5.3 - C32.150  © 2024 UpToDate, Inc. and/or its affiliates. All rights reserved.  table 1: Definition of normal and high blood pressure  Level  Top number  Bottom number    High 130 or above 80 or above   Elevated 120 to 129 79 or below   Normal 119 or below 79 or below   These definitions are from the American College of Cardiology/American Heart Association. Other expert groups might use slightly different definitions.  "Elevated blood pressure" is a term doctor or nurses use as a warning. It means you do not yet have high blood pressure, but your blood pressure is not as low as it should be for good health.  Graphic 96935 Version 6.0  Consumer Information Use and Disclaimer   Disclaimer: This generalized information is a limited summary of diagnosis, treatment, and/or medication information. It is not meant to be comprehensive and should be used as a tool to help the user understand and/or assess potential diagnostic and treatment options. It does NOT include all information about conditions, treatments, medications, side effects, or risks that may apply to a specific patient. It is not intended to be medical advice or a substitute for the medical advice, diagnosis, or treatment of a health care provider based on the health care provider's examination and assessment of a patient's specific and unique circumstances. Patients must speak " "with a health care provider for complete information about their health, medical questions, and treatment options, including any risks or benefits regarding use of medications. This information does not endorse any treatments or medications as safe, effective, or approved for treating a specific patient. UpToDate, Inc. and its affiliates disclaim any warranty or liability relating to this information or the use thereof.The use of this information is governed by the Terms of Use, available at https://www.woltersMarco Vascouwer.com/en/know/clinical-effectiveness-terms. 2024© UpToDate, Inc. and its affiliates and/or licensors. All rights reserved.  Copyright   © 2024 UpToDate, Inc. and/or its affiliates. All rights reserved.  Patient Education     Hemoglobin A1C tests   The Basics   Written by the doctors and editors at [employer]   What is hemoglobin A1C? -- This is a blood test that shows what your average blood sugar level has been for the past 2 to 3 months. Doctors and nurses use this test for 2 reasons:   To see whether you have diabetes   To see whether diabetes treatment is working the right way  Other names for hemoglobin A1C are "glycated hemoglobin," "HbA1C," or just "A1C."  How does A1C relate to my blood sugar? -- The table shows how your A1C numbers relate to your actual blood sugar level (table 1).  What should my A1C numbers be? -- That depends on why you have the test.   When checking for diabetes - If you had an A1C test to see if you have diabetes, an A1C of 5.6 or less is in the normal range.   If your A1C is 6.5 or higher, it probably means that you have diabetes, but you should have the test done again to be sure.   If your A1C is between 5.7 and 6.4, doctors call this "prediabetes." It means that you are at higher risk for getting diabetes. Certain lifestyle changes can help prevent diabetes. These can include being more active, making changes to your diet, and losing weight (if needed).   When checking how " "treatment is working - If you already know that you have diabetes, and you had an A1C test to see how well your blood sugar is managed, your A1C should probably be 7 or less. If you are older than 65 years or have other medical conditions, your A1C goal might be higher. But check with your doctor for what your level should be. Not everyone with diabetes is the same. Some people need to aim for different A1C levels than others.  Can I do this test at home? -- It is possible to buy kits to test your A1C at home. But home testing of A1C is not usually necessary.  How often should I have an A1C test? -- That depends on whether you have diabetes and what your last A1C test showed.   If you had an A1C test to check for diabetes and your A1C was less than 5.7 (meaning that you do not have diabetes or prediabetes), you generally should have A1C tests done every 3 years.   If you had an A1C test to check for diabetes and your A1C was between 5.7 and 6.4 (meaning that you have prediabetes), you should have A1C tests about once a year. Your doctor or nurse will tell you how often to be tested.   If you do have diabetes and your blood sugar is at goal, you should have A1C tests every 6 months.   If you have diabetes and you recently changed treatment plans or your blood sugar is not at goal, you should have A1C tests every 3 months.  Why do my A1C numbers matter? -- Studies show that keeping A1C numbers at goal helps keep people from getting:   Diabetic retinopathy, an eye disease that can cause blindness   Nerve damage caused by diabetes (also called "neuropathy")   Kidney disease  Keeping your A1C at goal might also help prevent future heart attack or stroke, but this is less certain.  Should I still measure my blood sugar at home? -- If your doctor wants you to check your blood sugar at home, keep doing so even if you have routine A1C tests. Blood sugar tests tell you what your blood sugar is from moment to moment. That's " important information, because it lets you know if your medicines and lifestyle changes are keeping your blood sugar in your goal range.  All topics are updated as new evidence becomes available and our peer review process is complete.  This topic retrieved from [employer] on: May 30, 2024.  Topic 28404 Version 13.0  Release: 32.5.3 - C32.150  © 2024 UpToDate, Inc. and/or its affiliates. All rights reserved.  table 1: A1C level and average blood sugar  If your A1C level is (percent):  That means your average blood sugar level during the past 2 to 3 months was about:     If you live in the [place], use these values. Your blood sugar is measured in milligrams/deciliter (mg/dL).  If you live outside the [place], use these values. Your blood sugar is measured in millimoles/liter (mmol/L).    5 97 5.4   6 126 7   7 154 8.6   8 183 10.2   9 212 11.8   10 240 13.3   11 269 15   12 298 16.5   13 326 18.1   14 355 19.7   The A1C blood test tells you what your average blood sugar level has been for the past 2 to 3 months. This table lists which A1C levels go with which average blood sugar levels. Blood sugar is measured differently in the [place] than it is in most other countries. The column in the middle is for people in the [place]. The column on the right is for people who live outside the [place].  A1C: glycated hemoglobin.  Graphic 93170 Version 5.0  Consumer Information Use and Disclaimer   Disclaimer: This generalized information is a limited summary of diagnosis, treatment, and/or medication information. It is not meant to be comprehensive and should be used as a tool to help the user understand and/or assess potential diagnostic and treatment options. It does NOT include all information about conditions, treatments, medications, side effects, or risks that may apply to a specific patient. It is not intended to be medical advice or a substitute for the medical advice, diagnosis, or treatment of a health care provider based on the health care  provider's examination and assessment of a patient's specific and unique circumstances. Patients must speak with a health care provider for complete information about their health, medical questions, and treatment options, including any risks or benefits regarding use of medications. This information does not endorse any treatments or medications as safe, effective, or approved for treating a specific patient. UpToDate, Inc. and its affiliates disclaim any warranty or liability relating to this information or the use thereof.The use of this information is governed by the Terms of Use, available at https://www.wolColovoreuwer.com/en/know/clinical-effectiveness-terms. 2024© UpToDate, Inc. and its affiliates and/or licensors. All rights reserved.  Copyright   © 2024 UpToDate, Inc. and/or its affiliates. All rights reserved.

## 2025-04-25 NOTE — PROGRESS NOTES
Subjective     Patient ID: Komal Busch is a 45 y.o. female.    Chief Complaint: Healthy You (Presents to the clinic for healthy you visit. Non fasting. ) and Health Maintenance (Hepatitis C Screening Never done- Declines /HIV Screening Never done - Declines )    Healthy you. She needs medication refills and lab work.  She has a history of Hypertension, Vit D Deficiency, GERD, hyperlipidemia, DM 2. She has no complaints today. She denies any shortness of breath or chest pain.       Review of Systems   Constitutional: Negative.  Negative for activity change, appetite change and fatigue.   HENT: Negative.  Negative for trouble swallowing.    Eyes: Negative.  Negative for pain and visual disturbance.   Respiratory: Negative.  Negative for chest tightness and shortness of breath.    Cardiovascular: Negative.  Negative for chest pain and palpitations.   Gastrointestinal: Negative.  Negative for change in bowel habit.   Endocrine: Negative.    Genitourinary: Negative.  Negative for difficulty urinating and dysuria.   Musculoskeletal: Negative.  Negative for arthralgias and myalgias.   Integumentary:  Negative for rash and wound. Negative.   Allergic/Immunologic: Negative.    Neurological: Negative.  Negative for weakness and headaches.   Hematological: Negative.    Psychiatric/Behavioral: Negative.  The patient is not nervous/anxious.        Tobacco Use: Low Risk  (4/25/2025)    Patient History     Smoking Tobacco Use: Never     Smokeless Tobacco Use: Never     Passive Exposure: Never     Review of patient's allergies indicates:   Allergen Reactions    Keflex [cephalexin]     Neosporin [neomycin-bacitracnzn-polymyxnb]     Pcn [penicillins]     Reglan [metoclopramide]      Current Outpatient Medications   Medication Instructions    nebivoloL (BYSTOLIC) 20 mg, Oral, Daily    pantoprazole (PROTONIX) 40 mg, Oral, Daily    pyrilamine-chlophedianoL (NINJACOF) 12.5-12.5 mg/5 mL Liqd 10 mLs, Oral, 2 times daily PRN  "    Medications Discontinued During This Encounter   Medication Reason    fluticasone propionate (FLONASE) 50 mcg/actuation nasal spray Patient no longer taking    nebivoloL (BYSTOLIC) 20 mg Tab Reorder    pantoprazole (PROTONIX) 40 MG tablet Reorder       Past Medical History:   Diagnosis Date    Encounter for screening for COVID-19 9/29/2021    Exposure to COVID-19 virus 7/28/2021    GERD (gastroesophageal reflux disease)     Hypertension     Nonintractable headache 8/16/2021    Pleurisy 10/29/2021     Health Maintenance Topics with due status: Not Due       Topic Last Completion Date    Colorectal Cancer Screening 06/26/2023    Hemoglobin A1c (Prediabetes) 09/04/2024    Lipid Panel 09/04/2024    Mammogram 10/21/2024    TETANUS VACCINE 04/13/2025    RSV Vaccine (Age 60+ and Pregnant patients) Not Due     Immunization History   Administered Date(s) Administered    Tdap 04/13/2025       Objective     Body mass index is 46.47 kg/m².  Wt Readings from Last 3 Encounters:   04/25/25 (!) 138.6 kg (305 lb 9.6 oz)   04/13/25 136.1 kg (300 lb)   01/27/25 132.7 kg (292 lb 9.6 oz)     Ht Readings from Last 3 Encounters:   04/25/25 5' 8" (1.727 m)   04/13/25 5' 8" (1.727 m)   01/27/25 5' 8" (1.727 m)     BP Readings from Last 3 Encounters:   04/25/25 127/78   04/13/25 123/73   01/27/25 135/89     Temp Readings from Last 3 Encounters:   04/25/25 97.7 °F (36.5 °C) (Oral)   04/13/25 98.2 °F (36.8 °C)   01/27/25 99.1 °F (37.3 °C) (Oral)     Pulse Readings from Last 3 Encounters:   04/25/25 63   04/13/25 60   01/27/25 90     Resp Readings from Last 3 Encounters:   04/25/25 18   04/13/25 18   01/27/25 18     PF Readings from Last 3 Encounters:   No data found for PF       Physical Exam  Constitutional:       General: She is not in acute distress.     Appearance: Normal appearance. She is well-developed. She is obese. She is not ill-appearing.   HENT:      Head: Normocephalic and atraumatic.      Right Ear: Tympanic membrane normal. "      Left Ear: Tympanic membrane normal.      Nose: Nose normal.      Mouth/Throat:      Mouth: Mucous membranes are moist.      Pharynx: Oropharynx is clear. No posterior oropharyngeal erythema.   Cardiovascular:      Rate and Rhythm: Normal rate and regular rhythm.      Pulses: Normal pulses.      Heart sounds: Normal heart sounds.   Pulmonary:      Effort: Pulmonary effort is normal. No accessory muscle usage or respiratory distress.      Breath sounds: Normal breath sounds.   Abdominal:      General: Abdomen is flat. Bowel sounds are normal. There is no distension.      Palpations: Abdomen is soft.      Tenderness: There is no abdominal tenderness.   Musculoskeletal:         General: Normal range of motion.      Cervical back: Normal range of motion.   Skin:     General: Skin is warm and dry.      Capillary Refill: Capillary refill takes less than 2 seconds.   Neurological:      Mental Status: She is alert and oriented to person, place, and time. Mental status is at baseline.   Psychiatric:         Mood and Affect: Mood normal.         Speech: Speech normal.         Behavior: Behavior normal. Behavior is cooperative.         Thought Content: Thought content normal.         Assessment and Plan     Problem List Items Addressed This Visit       Hypertension    Hypertension is controlled at this time. Continue current medication, is effective at this time. Return to the clinic as needed.   -Low salt, low sodium diet, less fried foods, more baked foods, more green leafy vegetables, more fruits, less bread  -CBC, CMP today. Will call with results.   -Exercise at least 30-45 minutes a day  -Follow up in 6 months.             Relevant Medications    nebivoloL (BYSTOLIC) 20 mg Tab    Other Relevant Orders    CBC Auto Differential    Comprehensive Metabolic Panel    Gastroesophageal reflux disease    Continue Protonix 40mg daily  -Avoid spicy, greasy foods  -Avoid caffeine, citric acid, chocolate, peppermint, and  carbonated drinks  -Do not lay down within 3 hours of eating  -Exercise 150 minutes per week  -Increase fluid to 64 ounces daily  -Avoid antiinflammatory medications such as motrin, advil, aleve, ibuprofen, and BC powder           Relevant Medications    pantoprazole (PROTONIX) 40 MG tablet    Diabetes mellitus type 2 with complications    Diabetes is controlled at this time. Continue current medication, is effective at this time. Return to the clinic as needed.   -Blood sugar is controlled at this time, will get an A1C today.   -Work on diet, specifically cutting back on bread, breaded things, cereal, sugars, sugary drinks, cakes etc.   -Add free weights if you can even light weights will help.   -Keep sugars, fasting sugar goal is , after eating no greater than 180.   -A1C goal is 7.0% or less.           Screening for lipoid disorders    Screening lipids          Relevant Orders    Lipid Panel    Screening for diabetes mellitus    She has DM 2.          Relevant Orders    Hemoglobin A1C    Routine general medical examination at a health care facility - Primary    Follow up in 6 months for healthy you  Exercise at least 30-45 minutes a day  Eat a healthy diet, low carb, low fat.   Return to the clinic as needed            Encounter for screening for HIV    Screening HIV          Relevant Orders    HIV 1/2 Ag/Ab (4th Gen)    Encounter for hepatitis C screening test for low risk patient    Screening HEP C          Relevant Orders    Hepatitis C Antibody       Plan:       I have reviewed the medications, allergies, and problem list.     Goal Actions:    What type of visit is the patient here for today?: Healthy You  Does the patient consent to enroll in [employer]?: Yes  Is this a Wellness Follow Up?: No  What is your overall wellness goal? (select at least one): Lose weight, Understand disease process, Improve overall health  Choose 3: Lifestyle, Exercise, Nutrition  Lifestyle Actions : Develop good  support system, Take medications as prescribed  Nurtrition Actions: Read nutrition labels, Recommend weight loss, Eat a well-balanced diet  Exercise Actions: Recommend physical activity 30 minutes per day 3-5 times/week

## 2025-04-25 NOTE — ASSESSMENT & PLAN NOTE
Follow up in 6 months for healthy you  Exercise at least 30-45 minutes a day  Eat a healthy diet, low carb, low fat.   Return to the clinic as needed

## 2025-04-29 ENCOUNTER — RESULTS FOLLOW-UP (OUTPATIENT)
Dept: FAMILY MEDICINE | Facility: CLINIC | Age: 45
End: 2025-04-29